# Patient Record
Sex: FEMALE | Race: WHITE | NOT HISPANIC OR LATINO | URBAN - METROPOLITAN AREA
[De-identification: names, ages, dates, MRNs, and addresses within clinical notes are randomized per-mention and may not be internally consistent; named-entity substitution may affect disease eponyms.]

---

## 2017-07-19 RX ORDER — SERTRALINE 25 MG/1
0 TABLET, FILM COATED ORAL
Qty: 30 | Refills: 0 | COMMUNITY
Start: 2017-07-19

## 2017-08-16 ENCOUNTER — INPATIENT (INPATIENT)
Facility: HOSPITAL | Age: 42
LOS: 4 days | Discharge: HOME | DRG: 571 | End: 2017-08-21
Attending: INTERNAL MEDICINE | Admitting: INTERNAL MEDICINE
Payer: COMMERCIAL

## 2017-08-16 VITALS
HEART RATE: 102 BPM | HEIGHT: 69 IN | DIASTOLIC BLOOD PRESSURE: 86 MMHG | TEMPERATURE: 100 F | SYSTOLIC BLOOD PRESSURE: 125 MMHG | RESPIRATION RATE: 17 BRPM | WEIGHT: 293 LBS | OXYGEN SATURATION: 95 %

## 2017-08-16 LAB
ALBUMIN SERPL ELPH-MCNC: 3.4 G/DL — LOW (ref 3.5–5)
ANION GAP SERPL CALC-SCNC: 9 MMOL/L — SIGNIFICANT CHANGE UP (ref 5–17)
BASOPHILS # BLD AUTO: 0.1 K/UL — SIGNIFICANT CHANGE UP (ref 0–0.2)
BASOPHILS NFR BLD AUTO: 1.2 % — SIGNIFICANT CHANGE UP (ref 0–2)
BUN SERPL-MCNC: 16 MG/DL — SIGNIFICANT CHANGE UP (ref 7–18)
CALCIUM SERPL-MCNC: 9.1 MG/DL — SIGNIFICANT CHANGE UP (ref 8.4–10.5)
CHLORIDE SERPL-SCNC: 105 MMOL/L — SIGNIFICANT CHANGE UP (ref 96–108)
CO2 SERPL-SCNC: 25 MMOL/L — SIGNIFICANT CHANGE UP (ref 22–31)
EOSINOPHIL # BLD AUTO: 0.3 K/UL — SIGNIFICANT CHANGE UP (ref 0–0.5)
EOSINOPHIL NFR BLD AUTO: 2.5 % — SIGNIFICANT CHANGE UP (ref 0–6)
GLUCOSE SERPL-MCNC: 139 MG/DL — HIGH (ref 70–99)
HCT VFR BLD CALC: 36.7 % — SIGNIFICANT CHANGE UP (ref 34.5–45)
HGB BLD-MCNC: 11.8 G/DL — SIGNIFICANT CHANGE UP (ref 11.5–15.5)
LYMPHOCYTES # BLD AUTO: 2.8 K/UL — SIGNIFICANT CHANGE UP (ref 1–3.3)
LYMPHOCYTES # BLD AUTO: 24.9 % — SIGNIFICANT CHANGE UP (ref 13–44)
MCHC RBC-ENTMCNC: 27.2 PG — SIGNIFICANT CHANGE UP (ref 27–34)
MCHC RBC-ENTMCNC: 32 GM/DL — SIGNIFICANT CHANGE UP (ref 32–36)
MCV RBC AUTO: 84.8 FL — SIGNIFICANT CHANGE UP (ref 80–100)
MONOCYTES # BLD AUTO: 0.8 K/UL — SIGNIFICANT CHANGE UP (ref 0–0.9)
MONOCYTES NFR BLD AUTO: 7.2 % — SIGNIFICANT CHANGE UP (ref 2–14)
NEUTROPHILS # BLD AUTO: 7.2 K/UL — SIGNIFICANT CHANGE UP (ref 1.8–7.4)
NEUTROPHILS NFR BLD AUTO: 64.2 % — SIGNIFICANT CHANGE UP (ref 43–77)
PLATELET # BLD AUTO: 282 K/UL — SIGNIFICANT CHANGE UP (ref 150–400)
POTASSIUM SERPL-MCNC: 3.7 MMOL/L — SIGNIFICANT CHANGE UP (ref 3.5–5.3)
POTASSIUM SERPL-SCNC: 3.7 MMOL/L — SIGNIFICANT CHANGE UP (ref 3.5–5.3)
RBC # BLD: 4.32 M/UL — SIGNIFICANT CHANGE UP (ref 3.8–5.2)
RBC # FLD: 13.3 % — SIGNIFICANT CHANGE UP (ref 10.3–14.5)
SODIUM SERPL-SCNC: 139 MMOL/L — SIGNIFICANT CHANGE UP (ref 135–145)
WBC # BLD: 11.1 K/UL — HIGH (ref 3.8–10.5)
WBC # FLD AUTO: 11.1 K/UL — HIGH (ref 3.8–10.5)

## 2017-08-16 RX ORDER — KETOROLAC TROMETHAMINE 30 MG/ML
30 SYRINGE (ML) INJECTION ONCE
Qty: 0 | Refills: 0 | Status: DISCONTINUED | OUTPATIENT
Start: 2017-08-16 | End: 2017-08-16

## 2017-08-16 RX ORDER — SODIUM CHLORIDE 9 MG/ML
3 INJECTION INTRAMUSCULAR; INTRAVENOUS; SUBCUTANEOUS ONCE
Qty: 0 | Refills: 0 | Status: COMPLETED | OUTPATIENT
Start: 2017-08-16 | End: 2017-08-16

## 2017-08-16 RX ADMIN — Medication 100 MILLIGRAM(S): at 23:59

## 2017-08-16 RX ADMIN — SODIUM CHLORIDE 3 MILLILITER(S): 9 INJECTION INTRAMUSCULAR; INTRAVENOUS; SUBCUTANEOUS at 23:33

## 2017-08-16 NOTE — ED PROVIDER NOTE - PROGRESS NOTE DETAILS
labs show wbc 11K, CT shows cellulitic changes without drainable abscess  Discussed above with patient and Dr. Dawn who agree with plan for admission

## 2017-08-16 NOTE — ED PROVIDER NOTE - OBJECTIVE STATEMENT
43 y/o F w/ PMHx of NIDDM, HTN & Hx of recurrent skin infections presents to the ED w/ pain & redness over R posterior thigh. Pt reports she went to [facility] 5 days ago and noticed a boil on her R thigh, itchy in nature, 4 days ago. Pt states 2 days later, it became more painful and redness worsened. Pt states she was seen by PMD who prescribed Levaquin BID, which pt has been taking. Today pt was reevaluated by PMD who urged pt to go to ED for further management of skin infection. Pt reports since Monday redness & pain has worsened despite taking Naproxen 2x a day.  Pt reports wound has been draining w/ some purulent discharge. Pt denies fever, chills, or any other complaints. NKDA.

## 2017-08-16 NOTE — ED PROVIDER NOTE - MEDICAL DECISION MAKING DETAILS
43 y/o w/ DM presents w/ R thigh infection. Will obtain labs & CT to evaluate for deep abscess. Given clindamycin IV. 43 y/o w/ DM presents w/ worsening R thigh infection despite oral abx. Will obtain labs & CT to evaluate for deep abscess. Given clindamycin IV.

## 2017-08-16 NOTE — ED ADULT NURSE NOTE - OBJECTIVE STATEMENT
Patient complains of redness, pain to right upper leg. Patient noticed a boil to right upper leg x 4 days ago, was given antibiotics by PMD with no relief. Redness noted to right upper leg. Denies fever, chills,.

## 2017-08-16 NOTE — ED PROVIDER NOTE - PHYSICAL EXAMINATION
SKIN: 20 cm area of erythema over R posterior thigh. 5 cm central area w/ induration. Mild purulent discharge, no fluctuance, mod ttp.

## 2017-08-16 NOTE — ED ADULT NURSE NOTE - ED STAT RN HANDOFF DETAILS
Patient endorsed to ÓSCAR Salcido in B1, remains alert, responsive, resting in stretcher. MD evaluation in progress.

## 2017-08-17 DIAGNOSIS — E11.9 TYPE 2 DIABETES MELLITUS WITHOUT COMPLICATIONS: ICD-10-CM

## 2017-08-17 DIAGNOSIS — L03.119 CELLULITIS OF UNSPECIFIED PART OF LIMB: ICD-10-CM

## 2017-08-17 DIAGNOSIS — Z87.19 PERSONAL HISTORY OF OTHER DISEASES OF THE DIGESTIVE SYSTEM: Chronic | ICD-10-CM

## 2017-08-17 DIAGNOSIS — I10 ESSENTIAL (PRIMARY) HYPERTENSION: ICD-10-CM

## 2017-08-17 DIAGNOSIS — Z98.891 HISTORY OF UTERINE SCAR FROM PREVIOUS SURGERY: Chronic | ICD-10-CM

## 2017-08-17 DIAGNOSIS — Z29.9 ENCOUNTER FOR PROPHYLACTIC MEASURES, UNSPECIFIED: ICD-10-CM

## 2017-08-17 DIAGNOSIS — L02.419 CUTANEOUS ABSCESS OF LIMB, UNSPECIFIED: ICD-10-CM

## 2017-08-17 DIAGNOSIS — L03.115 CELLULITIS OF RIGHT LOWER LIMB: ICD-10-CM

## 2017-08-17 DIAGNOSIS — F32.9 MAJOR DEPRESSIVE DISORDER, SINGLE EPISODE, UNSPECIFIED: ICD-10-CM

## 2017-08-17 LAB
ALP SERPL-CCNC: 68 U/L — SIGNIFICANT CHANGE UP (ref 40–120)
ALT FLD-CCNC: 19 U/L DA — SIGNIFICANT CHANGE UP (ref 10–60)
APPEARANCE UR: CLEAR — SIGNIFICANT CHANGE UP
AST SERPL-CCNC: 13 U/L — SIGNIFICANT CHANGE UP (ref 10–40)
BILIRUB SERPL-MCNC: 0.2 MG/DL — SIGNIFICANT CHANGE UP (ref 0.2–1.2)
BILIRUB UR-MCNC: NEGATIVE — SIGNIFICANT CHANGE UP
COLOR SPEC: YELLOW — SIGNIFICANT CHANGE UP
CREAT SERPL-MCNC: 0.8 MG/DL — SIGNIFICANT CHANGE UP (ref 0.5–1.3)
DIFF PNL FLD: ABNORMAL
GLUCOSE UR QL: NEGATIVE — SIGNIFICANT CHANGE UP
HCG SERPL-ACNC: <1 MIU/ML — SIGNIFICANT CHANGE UP
HCG UR QL: NEGATIVE — SIGNIFICANT CHANGE UP
KETONES UR-MCNC: NEGATIVE — SIGNIFICANT CHANGE UP
LEUKOCYTE ESTERASE UR-ACNC: NEGATIVE — SIGNIFICANT CHANGE UP
NITRITE UR-MCNC: NEGATIVE — SIGNIFICANT CHANGE UP
PH UR: 5 — SIGNIFICANT CHANGE UP (ref 5–8)
PROT SERPL-MCNC: 7.8 G/DL — SIGNIFICANT CHANGE UP (ref 6–8.3)
PROT UR-MCNC: NEGATIVE — SIGNIFICANT CHANGE UP
SP GR SPEC: 1.01 — SIGNIFICANT CHANGE UP (ref 1.01–1.02)
UROBILINOGEN FLD QL: NEGATIVE — SIGNIFICANT CHANGE UP

## 2017-08-17 PROCEDURE — 99285 EMERGENCY DEPT VISIT HI MDM: CPT | Mod: 25

## 2017-08-17 PROCEDURE — 99223 1ST HOSP IP/OBS HIGH 75: CPT | Mod: GC

## 2017-08-17 PROCEDURE — 71010: CPT | Mod: 26

## 2017-08-17 PROCEDURE — 73701 CT LOWER EXTREMITY W/DYE: CPT | Mod: 26,RT

## 2017-08-17 RX ORDER — DEXTROSE 50 % IN WATER 50 %
12.5 SYRINGE (ML) INTRAVENOUS ONCE
Qty: 0 | Refills: 0 | Status: DISCONTINUED | OUTPATIENT
Start: 2017-08-17 | End: 2017-08-18

## 2017-08-17 RX ORDER — SODIUM CHLORIDE 9 MG/ML
1000 INJECTION, SOLUTION INTRAVENOUS
Qty: 0 | Refills: 0 | Status: DISCONTINUED | OUTPATIENT
Start: 2017-08-17 | End: 2017-08-18

## 2017-08-17 RX ORDER — ACETAMINOPHEN 500 MG
650 TABLET ORAL EVERY 6 HOURS
Qty: 0 | Refills: 0 | Status: DISCONTINUED | OUTPATIENT
Start: 2017-08-17 | End: 2017-08-18

## 2017-08-17 RX ORDER — GLUCAGON INJECTION, SOLUTION 0.5 MG/.1ML
1 INJECTION, SOLUTION SUBCUTANEOUS ONCE
Qty: 0 | Refills: 0 | Status: DISCONTINUED | OUTPATIENT
Start: 2017-08-17 | End: 2017-08-18

## 2017-08-17 RX ORDER — OXYCODONE AND ACETAMINOPHEN 5; 325 MG/1; MG/1
2 TABLET ORAL EVERY 6 HOURS
Qty: 0 | Refills: 0 | Status: DISCONTINUED | OUTPATIENT
Start: 2017-08-17 | End: 2017-08-17

## 2017-08-17 RX ORDER — DEXTROSE 50 % IN WATER 50 %
25 SYRINGE (ML) INTRAVENOUS ONCE
Qty: 0 | Refills: 0 | Status: DISCONTINUED | OUTPATIENT
Start: 2017-08-17 | End: 2017-08-18

## 2017-08-17 RX ORDER — DEXTROSE 50 % IN WATER 50 %
1 SYRINGE (ML) INTRAVENOUS ONCE
Qty: 0 | Refills: 0 | Status: DISCONTINUED | OUTPATIENT
Start: 2017-08-17 | End: 2017-08-18

## 2017-08-17 RX ORDER — SERTRALINE 25 MG/1
100 TABLET, FILM COATED ORAL DAILY
Qty: 0 | Refills: 0 | Status: DISCONTINUED | OUTPATIENT
Start: 2017-08-17 | End: 2017-08-18

## 2017-08-17 RX ORDER — INSULIN LISPRO 100/ML
VIAL (ML) SUBCUTANEOUS
Qty: 0 | Refills: 0 | Status: DISCONTINUED | OUTPATIENT
Start: 2017-08-17 | End: 2017-08-18

## 2017-08-17 RX ORDER — OXYCODONE AND ACETAMINOPHEN 5; 325 MG/1; MG/1
1 TABLET ORAL EVERY 6 HOURS
Qty: 0 | Refills: 0 | Status: DISCONTINUED | OUTPATIENT
Start: 2017-08-17 | End: 2017-08-18

## 2017-08-17 RX ADMIN — Medication 650 MILLIGRAM(S): at 23:52

## 2017-08-17 RX ADMIN — Medication 30 MILLIGRAM(S): at 07:58

## 2017-08-17 RX ADMIN — Medication 650 MILLIGRAM(S): at 14:05

## 2017-08-17 RX ADMIN — Medication 650 MILLIGRAM(S): at 23:03

## 2017-08-17 RX ADMIN — Medication 30 MILLIGRAM(S): at 00:00

## 2017-08-17 RX ADMIN — Medication 100 MILLIGRAM(S): at 21:46

## 2017-08-17 RX ADMIN — SERTRALINE 100 MILLIGRAM(S): 25 TABLET, FILM COATED ORAL at 21:46

## 2017-08-17 RX ADMIN — Medication 2.5 MILLIGRAM(S): at 21:46

## 2017-08-17 RX ADMIN — Medication 100 MILLIGRAM(S): at 13:05

## 2017-08-17 NOTE — CONSULT NOTE ADULT - SUBJECTIVE AND OBJECTIVE BOX
HPI:  42 years old female from home, lives with family, PMH of HTN, DM, Depression presented with boil and redness on posterior aspect of right thigh. Patient stated that on Friday she went to [facility] [employer] in [place], was fine until Saturday morning when she noticed a boil and redness on posterior right thigh. Went to PCP on Monday, was given Levofloxacin 750 mg for 7 days, went back to PCP on Wed as it was progressively worsening and was asked to come to ED. Patient currently complains of pain, redness and tenderness at site of boil, has no difficulty in ambulating and only complains of mild  headache. Denies any other complains like fever, chills, dyspnea, chest pain, palpitations, abdominal pain, nausea, vomiting, changes in BM or urinary complains.   Works as . Denies current smoking (smoked  for few years as teenager). Social drinker. Denies drug abuse.  Premenopausal. LMP was on .   Follows up with PCP routinely.       PAST MEDICAL & SURGICAL HISTORY:  Depression  HTN (hypertension)  DM (diabetes mellitus)  H/O:   H/O appendicitis      penicillin (Rash)  Rocephin (Fever; Short breath)      Meds:  insulin lispro (HumaLOG) corrective regimen sliding scale   SubCutaneous three times a day before meals  dextrose 5%. 1000 milliLiter(s) IV Continuous <Continuous>  dextrose Gel 1 Dose(s) Oral once PRN  dextrose 50% Injectable 12.5 Gram(s) IV Push once  dextrose 50% Injectable 25 Gram(s) IV Push once  dextrose 50% Injectable 25 Gram(s) IV Push once  glucagon  Injectable 1 milliGRAM(s) IntraMuscular once PRN  enalapril 2.5 milliGRAM(s) Oral daily  sertraline 100 milliGRAM(s) Oral daily  acetaminophen   Tablet. 650 milliGRAM(s) Oral every 6 hours PRN  clindamycin IVPB   IV Intermittent   clindamycin IVPB 900 milliGRAM(s) IV Intermittent every 8 hours  oxyCODONE    5 mG/acetaminophen 325 mG 1 Tablet(s) Oral every 6 hours PRN      SOCIAL HISTORY:  Smoker:  YES / NO         ETOH use:  YES / NO                Ilicit Drug use:  YES / NO    FAMILY HISTORY:  Family history of lung cancer (Grandparent)  Family history of prostate cancer (Grandparent)  Family history of hypertension in father (Father)      VITALS:  Vital Signs Last 24 Hrs  T(C): 36.3 (17 Aug 2017 14:58), Max: 37.6 (16 Aug 2017 21:47)  T(F): 97.4 (17 Aug 2017 14:58), Max: 99.6 (16 Aug 2017 21:47)  HR: 68 (17 Aug 2017 14:58) (68 - 102)  BP: 103/63 (17 Aug 2017 14:58) (103/63 - 125/86)  BP(mean): 99 (17 Aug 2017 11:04) (99 - 99)  RR: 16 (17 Aug 2017 14:58) (16 - 18)  SpO2: 96% (17 Aug 2017 14:58) (94% - 99%)    LABS/DIAGNOSTIC TESTS:                          11.8   11.1  )-----------( 282      ( 16 Aug 2017 23:32 )             36.7     WBC Count: 11.1 K/uL ( @ 23:32)          139  |  105  |  16  ----------------------------<  139<H>  3.7   |  25  |  0.80    Ca    9.1      16 Aug 2017 23:32    TPro  7.8  /  Alb  3.4<L>  /  TBili  0.2  /  DBili  x   /  AST  13  /  ALT  19  /  AlkPhos  68  08-16      Urinalysis Basic - ( 17 Aug 2017 11:39 )    Color: Yellow / Appearance: Clear / S.010 / pH: x  Gluc: x / Ketone: Negative  / Bili: Negative / Urobili: Negative   Blood: x / Protein: Negative / Nitrite: Negative   Leuk Esterase: Negative / RBC: 0-2 /HPF / WBC 0-2 /HPF   Sq Epi: x / Non Sq Epi: Few / Bacteria: x        LIVER FUNCTIONS - ( 16 Aug 2017 23:32 )  Alb: 3.4 g/dL / Pro: 7.8 g/dL / ALK PHOS: 68 U/L / ALT: 19 U/L DA / AST: 13 U/L / GGT: x                 LACTATE:    ABG -     CULTURES:       RADIOLOGY:      ROS  [  ] UNABLE TO ELICIT HPI:  42 years old female from home, lives with family, PMH of HTN, DM, Depression presented with boil and redness on posterior aspect of right thigh. Patient stated that on Friday she went to [facility] in [place], was fine until Saturday morning when she noticed a boil and redness on posterior right thigh. Went to PCP on Monday, was given Levofloxacin 750 mg for 2 days, went back to PCP on Wed as it was  worsening and was asked to come to ED. Patient currently complains of pain, redness and tenderness at site of boil, has no difficulty in ambulating and only complains of mild  headache. Denies any other complains like fever, chills, dyspnea, chest pain, palpitations, abdominal pain, nausea, vomiting, changes in BM or urinary complains.   Works as . Denies current smoking (smoked  for few years as teenager). Social drinker. Denies drug abuse.  LMP was on .   Follows up with PCP routinely.       PAST MEDICAL & SURGICAL HISTORY:  Depression  HTN (hypertension)  DM (diabetes mellitus)  H/O:   H/O appendicitis      penicillin -she has taken amoxicillin without any problem. might be allergic to azithromycin  Rocephin (Fever; Short breath)      Meds:  insulin lispro (HumaLOG) corrective regimen sliding scale   SubCutaneous three times a day before meals  dextrose 5%. 1000 milliLiter(s) IV Continuous <Continuous>  dextrose Gel 1 Dose(s) Oral once PRN  dextrose 50% Injectable 12.5 Gram(s) IV Push once  dextrose 50% Injectable 25 Gram(s) IV Push once  dextrose 50% Injectable 25 Gram(s) IV Push once  glucagon  Injectable 1 milliGRAM(s) IntraMuscular once PRN  enalapril 2.5 milliGRAM(s) Oral daily  sertraline 100 milliGRAM(s) Oral daily  acetaminophen   Tablet. 650 milliGRAM(s) Oral every 6 hours PRN  clindamycin IVPB   IV Intermittent   clindamycin IVPB 900 milliGRAM(s) IV Intermittent every 8 hours  oxyCODONE    5 mG/acetaminophen 325 mG 1 Tablet(s) Oral every 6 hours PRN      SOCIAL HISTORY:  Smoker:   NO         ETOH use:  YES socially              Ilicit Drug use:  NO    FAMILY HISTORY:  Family history of lung cancer (Grandparent)  Family history of prostate cancer (Grandparent)  Family history of hypertension in father (Father)      VITALS:  Vital Signs Last 24 Hrs  T(C): 36.3 (17 Aug 2017 14:58), Max: 37.6 (16 Aug 2017 21:47)  T(F): 97.4 (17 Aug 2017 14:58), Max: 99.6 (16 Aug 2017 21:47)  HR: 68 (17 Aug 2017 14:58) (68 - 102)  BP: 103/63 (17 Aug 2017 14:58) (103/63 - 125/86)  BP(mean): 99 (17 Aug 2017 11:04) (99 - 99)  RR: 16 (17 Aug 2017 14:58) (16 - 18)  SpO2: 96% (17 Aug 2017 14:58) (94% - 99%)    LABS/DIAGNOSTIC TESTS:                          11.8   11.1  )-----------( 282      ( 16 Aug 2017 23:32 )             36.7     WBC Count: 11.1 K/uL ( @ 23:32)          139  |  105  |  16  ----------------------------<  139<H>  3.7   |  25  |  0.80    Ca    9.1      16 Aug 2017 23:32    TPro  7.8  /  Alb  3.4<L>  /  TBili  0.2  /  DBili  x   /  AST  13  /  ALT  19  /  AlkPhos  68  08-16      Urinalysis Basic - ( 17 Aug 2017 11:39 )    Color: Yellow / Appearance: Clear / S.010 / pH: x  Gluc: x / Ketone: Negative  / Bili: Negative / Urobili: Negative   Blood: x / Protein: Negative / Nitrite: Negative   Leuk Esterase: Negative / RBC: 0-2 /HPF / WBC 0-2 /HPF   Sq Epi: x / Non Sq Epi: Few / Bacteria: x        LIVER FUNCTIONS - ( 16 Aug 2017 23:32 )  Alb: 3.4 g/dL / Pro: 7.8 g/dL / ALK PHOS: 68 U/L / ALT: 19 U/L DA / AST: 13 U/L / GGT: x                 LACTATE:    ABG -     CULTURES:       RADIOLOGY:      ROS  [  ] UNABLE TO ELICIT

## 2017-08-17 NOTE — H&P ADULT - NSHPSOCIALHISTORY_GEN_ALL_CORE
Lives with Family, has 3 kids.   Works as .   Denies current smoking (smoked  for few years as teenager).   Social drinker.   Denies drug abuse.    Premenopausal. LMP was on 8/12.

## 2017-08-17 NOTE — H&P ADULT - NEGATIVE CARDIOVASCULAR SYMPTOMS
no palpitations/no chest pain/no paroxysmal nocturnal dyspnea/no dyspnea on exertion/no orthopnea/no peripheral edema

## 2017-08-17 NOTE — H&P ADULT - FAMILY HISTORY
Father  Still living? Unknown  Family history of hypertension in father, Age at diagnosis: Age Unknown     Grandparent  Still living? Unknown  Family history of prostate cancer, Age at diagnosis: Age Unknown  Family history of lung cancer, Age at diagnosis: Age Unknown

## 2017-08-17 NOTE — CONSULT NOTE ADULT - SUBJECTIVE AND OBJECTIVE BOX
Patient is a 42y old  Female who presents with a chief complaint of boil and redness on posterior right thigh (17 Aug 2017 11:04)      Called see and eval 42 y.o. F w/ PMH DM for right posterior thigh cellulitis. Pt admitted today c/o increasing redness and pain in posterior right thigh after visiting [facility]. Pt went to see PMD Monday and was prescribed Levofloxacin. Pt returned to PMD Wednesday c/o no improvement and was advised to come to [facility]. Currently resting comfortably. Denies fever, chills, N/V, numbness/tingling of RLE.    PAST MEDICAL & SURGICAL HISTORY:  Depression  HTN (hypertension)  DM (diabetes mellitus)  H/O:   H/O appendicitis      MEDICATIONS  (STANDING):  insulin lispro (HumaLOG) corrective regimen sliding scale   SubCutaneous three times a day before meals  dextrose 5%. 1000 milliLiter(s) (50 mL/Hr) IV Continuous <Continuous>  dextrose 50% Injectable 12.5 Gram(s) IV Push once  dextrose 50% Injectable 25 Gram(s) IV Push once  dextrose 50% Injectable 25 Gram(s) IV Push once  enalapril 2.5 milliGRAM(s) Oral daily  sertraline 100 milliGRAM(s) Oral daily  clindamycin IVPB   IV Intermittent   clindamycin IVPB 900 milliGRAM(s) IV Intermittent every 8 hours    MEDICATIONS  (PRN):  dextrose Gel 1 Dose(s) Oral once PRN Blood Glucose LESS THAN 70 milliGRAM(s)/deciLiter  glucagon  Injectable 1 milliGRAM(s) IntraMuscular once PRN Glucose <70 milliGRAM(s)/deciLiter  acetaminophen   Tablet. 650 milliGRAM(s) Oral every 6 hours PRN Mild Pain (1 - 3)  oxyCODONE    5 mG/acetaminophen 325 mG 1 Tablet(s) Oral every 6 hours PRN Moderate Pain (4 - 6)      Allergies    penicillin (Rash)  Rocephin (Fever; Short breath)    Intolerances        Vital Signs Last 24 Hrs  T(C): 37.6 (17 Aug 2017 11:04), Max: 37.6 (16 Aug 2017 21:47)  T(F): 99.6 (17 Aug 2017 11:04), Max: 99.6 (16 Aug 2017 21:47)  HR: 102 (17 Aug 2017 11:04) (73 - 102)  BP: 125/86 (17 Aug 2017 11:04) (105/56 - 125/86)  BP(mean): 99 (17 Aug 2017 11:04) (99 - 99)  RR: 17 (17 Aug 2017 11:04) (17 - 18)  SpO2: 95% (17 Aug 2017 11:04) (94% - 99%)    Physical:  Gen: A&Ox3. NAD  RLE: Warm, NVI, large area of erythema, ~2cm smaller circumferentially than marked area. Central area of mild purpura without punctum. Indurated without central fluctuance. Tender. No streaking noted. No lymphadenopathy noted    I&O's Summary      LABS:                        11.8   11.1  )-----------( 282      ( 16 Aug 2017 23:32 )             36.7              08-16    139  |  105  |  16  ----------------------------<  139<H>  3.7   |  25  |  0.80    Ca    9.1      16 Aug 2017 23:32    TPro  7.8  /  Alb  3.4<L>  /  TBili  0.2  /  DBili  x   /  AST  13  /  ALT  19  /  AlkPhos  68  08-16              Urinalysis Basic - ( 17 Aug 2017 11:39 )    Color: Yellow / Appearance: Clear / S.010 / pH: x  Gluc: x / Ketone: Negative  / Bili: Negative / Urobili: Negative   Blood: x / Protein: Negative / Nitrite: Negative   Leuk Esterase: Negative / RBC: 0-2 /HPF / WBC 0-2 /HPF   Sq Epi: x / Non Sq Epi: Few / Bacteria: x        RADIOLOGY & ADDITIONAL STUDIES:  < from: CT Femur w/ IV Cont, Right (17 @ 00:51) >  FINDINGS:  There is skin thickening and mild infiltration of the subcutaneous fat of   the right mid posterior thigh. No fluid collection or soft tissue mass is   seen. Normal enhancement is seen. No subcutaneous gas is seen. No joint   effusions at the knee and hip.    < end of copied text >

## 2017-08-17 NOTE — H&P ADULT - NSHPLABSRESULTS_GEN_ALL_CORE
CBC:                          11.8   11.1  )-----------( 282      ( 16 Aug 2017 23:32 )             36.7       CMP:    08-16    139  |  105  |  16  ----------------------------<  139<H>  3.7   |  25  |  0.80    Ca    9.1      16 Aug 2017 23:32    TPro  7.8  /  Alb  3.4<L>  /  TBili  0.2  /  DBili  x   /  AST  13  /  ALT  19  /  AlkPhos  68  08-16    CT Femur with IV contrast : Mild cellulitic change in the right mid posterior thigh. No fluid collection.

## 2017-08-17 NOTE — H&P ADULT - NEGATIVE OPHTHALMOLOGIC SYMPTOMS
no photophobia/no lacrimation R/no blurred vision R/no lacrimation L/no blurred vision L/no diplopia

## 2017-08-17 NOTE — CONSULT NOTE ADULT - PROBLEM SELECTOR RECOMMENDATION 9
Plan for I&D 8/18/18 with Dr. Spring  NPO after midnight except PO meds  IVF when NPO  con't abx  pain control prn  to be consented  f/u medical clearance

## 2017-08-17 NOTE — H&P ADULT - ASSESSMENT
42 years old female from home, lives with family, PMH of HTN, DM, Depression presented with boil and redness on posterior aspect of right thigh. In ED vitals stable, labs showed elevated wbc of 11.1. Blood cultures sent. 1 dose of Clindamycin was given in ED. Admitted to medicine floor for cellulitis of posterior thigh.

## 2017-08-17 NOTE — H&P ADULT - PROBLEM SELECTOR PLAN 1
Boil noted on posterior right thigh associated with surrounding erythema and tenderness.  Afebrile, Mild Leukocytosis of 11.1.   f/u Blood Cultures.  Continue IV Clindamycin. Boil noted on posterior right thigh associated with surrounding erythema and tenderness.  Afebrile, Mild Leukocytosis of 11.1.   f/u Blood Cultures.  Continue IV Clindamycin.  ID Dr. Willis.  Surgical evaluation.

## 2017-08-17 NOTE — H&P ADULT - HISTORY OF PRESENT ILLNESS
42 years old female from home, lives with family, PMH of HTN, DM, Depression presented with boil and redness on posterior aspect of right thigh. Patient stated that on Friday she went to [employer] in [place], was fine until Saturday morning when she noticed a boil and redness on posterior right thigh. Went to PCP on Monday, was given Levofloxacin 750 mg for 7 days, went back to PCP on Wed as it was progressively worsening and was asked to come to ED. Patient currently complains of pain, redness and tenderness at site of boil, has no difficulty in ambulating and only complains of mild  headache. Denies any other complains like fever, chills, dyspnea, chest pain, palpitations, abdominal pain, nausea, vomiting, changes in BM or urinary complains.   Works as . Denies current smoking (smoked  for few years as teenager). Social drinker. Denies drug abuse.  Premenopausal. LMP was on 8/12.   Follows up with PCP routinely. Denies any recent Cardiac or GI workup. Has routine Mammogram and Pap smear, which has been normal. Reports having breast biopsy in 2000 for lump, which was removed but not cancerous.

## 2017-08-17 NOTE — H&P ADULT - ATTENDING COMMENTS
Patient seen/evaluated at bedside 8/17/17. I agree with the resident progress note/outlined plan of care. My independent findings and conclusions are documented. Patient seen/evaluated at bedside 8/17/17. I agree with the resident progress note/outlined plan of care. My independent findings and conclusions are documented.    43 y/o f w/ DM T II on oral hypoglycemic, HTN depression presents 6 days of swelling/erythema at posterior right leg. Was seen by her PMD 4 days ago and given Rx for levofloxacin. Patient was referrred to the ED after the erythema, swelling continued to progress despite the 2 days of antibiotics forming what the patient has called a boil. Patient reports development of prior boils in the past under her armpit and back. She denies fevers/chills/nausea/vomiting    She agrees to HIV/Hep c testing. She is not aware of what her blood sugars are at home.    Patient denies sob, palpitations, chest pain, orthopnea, PND, lower extremity swelling. She can ascend a flight of stairs w/out stopping and walking at least 4 blocks briskly w/out stopping.    1. posterior right leg cellulitis with abscess  2. DM T II  3. HTN    - continue with clindamycin, discuss with ID  -area affected demarcated and appears to be slightly receding  -surgery consulted  -for incision and drainage tomorrow  patient with >4 METs functional capacity, proceed to I&D w/out further cardiac work up. No medical contraindications for planned procedure  -NPO after MN for john

## 2017-08-17 NOTE — CONSULT NOTE ADULT - ASSESSMENT
42 y.o. F with Right posterior thigh abscess
right thigh abscess  right thigh cellulitis  leukocytosis  plan -  agree with I&D of abscess tomorrow  cont clindamycin 900mgs iv q8 hrs as pt states her cellulitis is improving  will likely treat with doxycycline when she goes home

## 2017-08-17 NOTE — H&P ADULT - PROBLEM SELECTOR PLAN 2
Patient takes Metformin 500 mg twice daily. Hold for now.  Started HSS for now.  f/u HbA1c.   Monitor accu cheks and adjust insulin regimen as needed.

## 2017-08-17 NOTE — PATIENT PROFILE ADULT. - MEDICATION DISPOSITION, PROFILE
Pt told to give medications to family member to be sent home. Pt A&Ox4, will  not take home meds as we are giving her same meds here./bedside

## 2017-08-18 DIAGNOSIS — L03.90 CELLULITIS, UNSPECIFIED: ICD-10-CM

## 2017-08-18 LAB
ANION GAP SERPL CALC-SCNC: 10 MMOL/L — SIGNIFICANT CHANGE UP (ref 5–17)
BASOPHILS # BLD AUTO: 0.1 K/UL — SIGNIFICANT CHANGE UP (ref 0–0.2)
BASOPHILS NFR BLD AUTO: 0.8 % — SIGNIFICANT CHANGE UP (ref 0–2)
BUN SERPL-MCNC: 15 MG/DL — SIGNIFICANT CHANGE UP (ref 7–18)
CALCIUM SERPL-MCNC: 8.8 MG/DL — SIGNIFICANT CHANGE UP (ref 8.4–10.5)
CHLORIDE SERPL-SCNC: 109 MMOL/L — HIGH (ref 96–108)
CHOLEST SERPL-MCNC: 128 MG/DL — SIGNIFICANT CHANGE UP (ref 10–199)
CO2 SERPL-SCNC: 24 MMOL/L — SIGNIFICANT CHANGE UP (ref 22–31)
CREAT SERPL-MCNC: 0.67 MG/DL — SIGNIFICANT CHANGE UP (ref 0.5–1.3)
CRP SERPL-MCNC: 4.5 MG/DL — HIGH (ref 0–0.4)
EOSINOPHIL # BLD AUTO: 0.3 K/UL — SIGNIFICANT CHANGE UP (ref 0–0.5)
EOSINOPHIL NFR BLD AUTO: 3.6 % — SIGNIFICANT CHANGE UP (ref 0–6)
ERYTHROCYTE [SEDIMENTATION RATE] IN BLOOD: 26 MM/HR — HIGH (ref 0–15)
GLUCOSE SERPL-MCNC: 109 MG/DL — HIGH (ref 70–99)
HAV IGM SER-ACNC: SIGNIFICANT CHANGE UP
HBA1C BLD-MCNC: 6 % — HIGH (ref 4–5.6)
HBV CORE IGM SER-ACNC: SIGNIFICANT CHANGE UP
HBV SURFACE AG SER-ACNC: SIGNIFICANT CHANGE UP
HCT VFR BLD CALC: 34.8 % — SIGNIFICANT CHANGE UP (ref 34.5–45)
HCV AB S/CO SERPL IA: 0.12 S/CO — SIGNIFICANT CHANGE UP
HCV AB SERPL-IMP: SIGNIFICANT CHANGE UP
HDLC SERPL-MCNC: 54 MG/DL — SIGNIFICANT CHANGE UP (ref 40–125)
HGB BLD-MCNC: 11.2 G/DL — LOW (ref 11.5–15.5)
HIV 1+2 AB+HIV1 P24 AG SERPL QL IA: SIGNIFICANT CHANGE UP
IGA FLD-MCNC: 197 MG/DL — SIGNIFICANT CHANGE UP (ref 68–378)
IGG FLD-MCNC: 1090 MG/DL — SIGNIFICANT CHANGE UP (ref 694–1618)
IGM SERPL-MCNC: 26 MG/DL — LOW (ref 40–230)
KAPPA LC SER QL IFE: 2 MG/DL — HIGH (ref 0.33–1.94)
KAPPA/LAMBDA FREE LIGHT CHAIN RATIO, SERUM: 0.96 RATIO — SIGNIFICANT CHANGE UP (ref 0.26–1.65)
LAMBDA LC SER QL IFE: 2.08 MG/DL — SIGNIFICANT CHANGE UP (ref 0.57–2.63)
LIPID PNL WITH DIRECT LDL SERPL: 53 MG/DL — SIGNIFICANT CHANGE UP
LYMPHOCYTES # BLD AUTO: 2.4 K/UL — SIGNIFICANT CHANGE UP (ref 1–3.3)
LYMPHOCYTES # BLD AUTO: 31.3 % — SIGNIFICANT CHANGE UP (ref 13–44)
MAGNESIUM SERPL-MCNC: 2.1 MG/DL — SIGNIFICANT CHANGE UP (ref 1.6–2.6)
MCHC RBC-ENTMCNC: 27.9 PG — SIGNIFICANT CHANGE UP (ref 27–34)
MCHC RBC-ENTMCNC: 32.2 GM/DL — SIGNIFICANT CHANGE UP (ref 32–36)
MCV RBC AUTO: 86.6 FL — SIGNIFICANT CHANGE UP (ref 80–100)
MONOCYTES # BLD AUTO: 0.7 K/UL — SIGNIFICANT CHANGE UP (ref 0–0.9)
MONOCYTES NFR BLD AUTO: 9.3 % — SIGNIFICANT CHANGE UP (ref 2–14)
NEUTROPHILS # BLD AUTO: 4.2 K/UL — SIGNIFICANT CHANGE UP (ref 1.8–7.4)
NEUTROPHILS NFR BLD AUTO: 54.9 % — SIGNIFICANT CHANGE UP (ref 43–77)
PHOSPHATE SERPL-MCNC: 3.8 MG/DL — SIGNIFICANT CHANGE UP (ref 2.5–4.5)
PLATELET # BLD AUTO: 266 K/UL — SIGNIFICANT CHANGE UP (ref 150–400)
POTASSIUM SERPL-MCNC: 3.8 MMOL/L — SIGNIFICANT CHANGE UP (ref 3.5–5.3)
POTASSIUM SERPL-SCNC: 3.8 MMOL/L — SIGNIFICANT CHANGE UP (ref 3.5–5.3)
RBC # BLD: 4.02 M/UL — SIGNIFICANT CHANGE UP (ref 3.8–5.2)
RBC # FLD: 13.2 % — SIGNIFICANT CHANGE UP (ref 10.3–14.5)
SODIUM SERPL-SCNC: 143 MMOL/L — SIGNIFICANT CHANGE UP (ref 135–145)
TOTAL CHOLESTEROL/HDL RATIO MEASUREMENT: 2.4 RATIO — LOW (ref 3.3–7.1)
TRIGL SERPL-MCNC: 107 MG/DL — SIGNIFICANT CHANGE UP (ref 10–149)
TSH SERPL-MCNC: 1.56 UU/ML — SIGNIFICANT CHANGE UP (ref 0.34–4.82)
WBC # BLD: 7.7 K/UL — SIGNIFICANT CHANGE UP (ref 3.8–10.5)
WBC # FLD AUTO: 7.7 K/UL — SIGNIFICANT CHANGE UP (ref 3.8–10.5)

## 2017-08-18 PROCEDURE — 99233 SBSQ HOSP IP/OBS HIGH 50: CPT | Mod: GC

## 2017-08-18 PROCEDURE — 88304 TISSUE EXAM BY PATHOLOGIST: CPT | Mod: 26

## 2017-08-18 RX ORDER — OXYCODONE AND ACETAMINOPHEN 5; 325 MG/1; MG/1
2 TABLET ORAL ONCE
Qty: 0 | Refills: 0 | Status: DISCONTINUED | OUTPATIENT
Start: 2017-08-18 | End: 2017-08-18

## 2017-08-18 RX ORDER — ONDANSETRON 8 MG/1
4 TABLET, FILM COATED ORAL ONCE
Qty: 0 | Refills: 0 | Status: DISCONTINUED | OUTPATIENT
Start: 2017-08-18 | End: 2017-08-18

## 2017-08-18 RX ORDER — MORPHINE SULFATE 50 MG/1
4 CAPSULE, EXTENDED RELEASE ORAL EVERY 4 HOURS
Qty: 0 | Refills: 0 | Status: DISCONTINUED | OUTPATIENT
Start: 2017-08-18 | End: 2017-08-21

## 2017-08-18 RX ORDER — SODIUM CHLORIDE 9 MG/ML
1000 INJECTION, SOLUTION INTRAVENOUS
Qty: 0 | Refills: 0 | Status: COMPLETED | OUTPATIENT
Start: 2017-08-18 | End: 2017-08-18

## 2017-08-18 RX ORDER — DEXTROSE 50 % IN WATER 50 %
25 SYRINGE (ML) INTRAVENOUS ONCE
Qty: 0 | Refills: 0 | Status: DISCONTINUED | OUTPATIENT
Start: 2017-08-18 | End: 2017-08-21

## 2017-08-18 RX ORDER — GLUCAGON INJECTION, SOLUTION 0.5 MG/.1ML
1 INJECTION, SOLUTION SUBCUTANEOUS ONCE
Qty: 0 | Refills: 0 | Status: DISCONTINUED | OUTPATIENT
Start: 2017-08-18 | End: 2017-08-21

## 2017-08-18 RX ORDER — HYDROMORPHONE HYDROCHLORIDE 2 MG/ML
0.5 INJECTION INTRAMUSCULAR; INTRAVENOUS; SUBCUTANEOUS
Qty: 0 | Refills: 0 | Status: DISCONTINUED | OUTPATIENT
Start: 2017-08-18 | End: 2017-08-18

## 2017-08-18 RX ORDER — HEPARIN SODIUM 5000 [USP'U]/ML
5000 INJECTION INTRAVENOUS; SUBCUTANEOUS EVERY 8 HOURS
Qty: 0 | Refills: 0 | Status: DISCONTINUED | OUTPATIENT
Start: 2017-08-18 | End: 2017-08-21

## 2017-08-18 RX ORDER — SERTRALINE 25 MG/1
100 TABLET, FILM COATED ORAL DAILY
Qty: 0 | Refills: 0 | Status: DISCONTINUED | OUTPATIENT
Start: 2017-08-18 | End: 2017-08-19

## 2017-08-18 RX ORDER — ACETAMINOPHEN 500 MG
650 TABLET ORAL EVERY 6 HOURS
Qty: 0 | Refills: 0 | Status: DISCONTINUED | OUTPATIENT
Start: 2017-08-18 | End: 2017-08-21

## 2017-08-18 RX ORDER — SODIUM CHLORIDE 9 MG/ML
1000 INJECTION, SOLUTION INTRAVENOUS
Qty: 0 | Refills: 0 | Status: DISCONTINUED | OUTPATIENT
Start: 2017-08-18 | End: 2017-08-21

## 2017-08-18 RX ORDER — DEXTROSE 50 % IN WATER 50 %
12.5 SYRINGE (ML) INTRAVENOUS ONCE
Qty: 0 | Refills: 0 | Status: DISCONTINUED | OUTPATIENT
Start: 2017-08-18 | End: 2017-08-21

## 2017-08-18 RX ORDER — DEXTROSE 50 % IN WATER 50 %
1 SYRINGE (ML) INTRAVENOUS ONCE
Qty: 0 | Refills: 0 | Status: DISCONTINUED | OUTPATIENT
Start: 2017-08-18 | End: 2017-08-21

## 2017-08-18 RX ORDER — OXYCODONE AND ACETAMINOPHEN 5; 325 MG/1; MG/1
1 TABLET ORAL EVERY 4 HOURS
Qty: 0 | Refills: 0 | Status: DISCONTINUED | OUTPATIENT
Start: 2017-08-18 | End: 2017-08-21

## 2017-08-18 RX ORDER — INSULIN LISPRO 100/ML
VIAL (ML) SUBCUTANEOUS
Qty: 0 | Refills: 0 | Status: DISCONTINUED | OUTPATIENT
Start: 2017-08-18 | End: 2017-08-21

## 2017-08-18 RX ADMIN — OXYCODONE AND ACETAMINOPHEN 2 TABLET(S): 5; 325 TABLET ORAL at 18:30

## 2017-08-18 RX ADMIN — OXYCODONE AND ACETAMINOPHEN 1 TABLET(S): 5; 325 TABLET ORAL at 21:26

## 2017-08-18 RX ADMIN — SODIUM CHLORIDE 75 MILLILITER(S): 9 INJECTION, SOLUTION INTRAVENOUS at 05:20

## 2017-08-18 RX ADMIN — Medication 100 MILLIGRAM(S): at 21:28

## 2017-08-18 RX ADMIN — HEPARIN SODIUM 5000 UNIT(S): 5000 INJECTION INTRAVENOUS; SUBCUTANEOUS at 21:28

## 2017-08-18 RX ADMIN — OXYCODONE AND ACETAMINOPHEN 2 TABLET(S): 5; 325 TABLET ORAL at 17:16

## 2017-08-18 RX ADMIN — Medication 100 MILLIGRAM(S): at 05:19

## 2017-08-18 RX ADMIN — OXYCODONE AND ACETAMINOPHEN 1 TABLET(S): 5; 325 TABLET ORAL at 22:58

## 2017-08-18 NOTE — PROGRESS NOTE ADULT - ASSESSMENT
41 yo F from home with PMH HTN, DM, and depression presented c/o boil and redness on posterior R thigh, admitted for abscess and cellulitis.

## 2017-08-18 NOTE — PROGRESS NOTE ADULT - SUBJECTIVE AND OBJECTIVE BOX
41 yo F from home with PMH HTN, DM, and depression presented c/o boil and redness on posterior R thigh which started saturday 8/12, day after she went to [facility]. Pt went to her PCP on monday, given levaquin 750mg for 7 days. Pt returned to PCP on wednesday due to worsening infection, referred to ED. Pt has no difficulty ambulating, no fevers or chills. She reports having boils in the past under her armpits and back.  Today pt reports feeling better, less pain, no other complaints. Indurated area outlined by ED improved. 43 yo F from home with PMH HTN, DM, and depression presented c/o boil and redness on posterior R thigh which started , day after she went to [facility]. Pt went to her PCP on monday, given levaquin 750mg for 7 days. Pt returned to PCP on wednesday due to worsening infection, referred to ED. Pt has no difficulty ambulating, no fevers or chills. She reports having boils in the past under her armpits and back.  Today pt reports feeling better, less pain, no other complaints. Indurated area outlined by ED improved.    ROS:  Constitutional - denies fevers, chills, headaches, appetite changes.  Cardio - denies chest pain, palpitations, SOB.  Resp - denies cough, sneezing, SOB  GI - denies abdominal pain, nausea, vomiting, constipation, diarrhea.   - denies dysuria, hematuria.  MSK - denies joint pain, muscle pain, edema.  Neuro - denies weakness, numbness, tingling.  Skin - boil and redness on posterior R thigh. denies rashes.    MEDICATIONS  (STANDING):  insulin lispro (HumaLOG) corrective regimen sliding scale   SubCutaneous three times a day before meals  dextrose 5%. 1000 milliLiter(s) (50 mL/Hr) IV Continuous <Continuous>  dextrose 50% Injectable 12.5 Gram(s) IV Push once  dextrose 50% Injectable 25 Gram(s) IV Push once  dextrose 50% Injectable 25 Gram(s) IV Push once  enalapril 2.5 milliGRAM(s) Oral daily  sertraline 100 milliGRAM(s) Oral daily  clindamycin IVPB   IV Intermittent   clindamycin IVPB 900 milliGRAM(s) IV Intermittent every 8 hours    MEDICATIONS  (PRN):  dextrose Gel 1 Dose(s) Oral once PRN Blood Glucose LESS THAN 70 milliGRAM(s)/deciLiter  glucagon  Injectable 1 milliGRAM(s) IntraMuscular once PRN Glucose <70 milliGRAM(s)/deciLiter  acetaminophen   Tablet. 650 milliGRAM(s) Oral every 6 hours PRN Mild Pain (1 - 3)  oxyCODONE    5 mG/acetaminophen 325 mG 1 Tablet(s) Oral every 6 hours PRN Moderate Pain (4 - 6)    Vital Signs Last 24 Hrs  T(C): 36.9 (18 Aug 2017 05:21), Max: 36.9 (18 Aug 2017 05:21)  T(F): 98.4 (18 Aug 2017 05:21), Max: 98.4 (18 Aug 2017 05:21)  HR: 76 (18 Aug 2017 05:21) (68 - 77)  BP: 115/76 (18 Aug 2017 05:21) (103/63 - 130/85)  BP(mean): --  RR: 16 (18 Aug 2017 05:21) (16 - 16)  SpO2: 96% (18 Aug 2017 05:21) (96% - 98%)    PE  General - 43 yo obese female appears well developed, well nourished, laying comfortably in NAD.  HEENT - NCAT  Neck - supple, no LAD, no JVD.  Cardio - RRR, +S1S2, no m/r/g.  Resp - CTA b/l, normal respiratory effort, no w/r/r.  Abd - soft, NTND, bowel sounds present, no CVA tenderness.  Extremities - no clubbing, cyanosis, or edema. peripheral pulses present bilaterally. Large abscess with discharge and surrounding cellulitis on posterior R thigh, tender, edematous, and erythematous.  Neuro - AOx3, 5/5 muscle strength, sensation intact.    Labs:                        11.2   7.7   )-----------( 266      ( 18 Aug 2017 06:12 )             34.8   08-18    143  |  109<H>  |  15  ----------------------------<  109<H>  3.8   |  24  |  0.67    Ca    8.8      18 Aug 2017 06:12  Phos  3.8     08-18  Mg     2.1     -18    TPro  7.8  /  Alb  3.4<L>  /  TBili  0.2  /  DBili  x   /  AST  13  /  ALT  19  /  AlkPhos  68  08-16    Urinalysis Basic - ( 17 Aug 2017 11:39 )    Color: Yellow / Appearance: Clear / S.010 / pH: x  Gluc: x / Ketone: Negative  / Bili: Negative / Urobili: Negative   Blood: x / Protein: Negative / Nitrite: Negative   Leuk Esterase: Negative / RBC: 0-2 /HPF / WBC 0-2 /HPF   Sq Epi: x / Non Sq Epi: Few / Bacteria: x    Imagin/17/17 CT R femur:  IMPRESSION:    Mild cellulitic change in the right mid posterior thigh. No fluid   collection.    17 CXR:  IMPRESSION: No evidence for focal infiltrate or lobar consolidation.

## 2017-08-18 NOTE — PROGRESS NOTE ADULT - SUBJECTIVE AND OBJECTIVE BOX
HPI:  42 years old female from home, lives with family, PMH of HTN, DM, Depression presented with boil and redness on posterior aspect of right thigh. Patient stated that on Friday she went to [employer] in [place], was fine until Saturday morning when she noticed a boil and redness on posterior right thigh. Went to PCP on Monday, was given Levofloxacin 750 mg for 7 days, went back to PCP on Wed as it was progressively worsening and was asked to come to ED. Patient currently complains of pain, redness and tenderness at site of boil, has no difficulty in ambulating and only complains of mild  headache. Denies any other complains like fever, chills, dyspnea, chest pain, palpitations, abdominal pain, nausea, vomiting, changes in BM or urinary complains.   Works as . Denies current smoking (smoked  for few years as teenager). Social drinker. Denies drug abuse.  Premenopausal. LMP was on .   Follows up with PCP routinely. Denies any recent Cardiac or GI workup. Has routine Mammogram and Pap smear, which has been normal. Reports having breast biopsy in  for lump, which was removed but not cancerous. (17 Aug 2017 11:04)      Patient is a 42y old  Female who presents with a chief complaint of boil and redness on posterior right thigh (17 Aug 2017 11:04)      INTERVAL HPI/OVERNIGHT EVENTS:  T(C): 36.9 (17 @ 05:21), Max: 36.9 (17 @ 05:21)  HR: 76 (17 @ 05:21) (68 - 77)  BP: 115/76 (17 @ 05:21) (103/63 - 130/85)  RR: 16 (17 @ 05:21) (16 - 16)  SpO2: 96% (17 @ 05:21) (96% - 98%)  Wt(kg): --  I&O's Summary      REVIEW OF SYSTEMS: denies fever, chills, SOB, palpitations, chest pain, abdominal pain, nausea, vomitting, diarrhea, constipation, dizziness    MEDICATIONS  (STANDING):  insulin lispro (HumaLOG) corrective regimen sliding scale   SubCutaneous three times a day before meals  dextrose 5%. 1000 milliLiter(s) (50 mL/Hr) IV Continuous <Continuous>  dextrose 50% Injectable 12.5 Gram(s) IV Push once  dextrose 50% Injectable 25 Gram(s) IV Push once  dextrose 50% Injectable 25 Gram(s) IV Push once  enalapril 2.5 milliGRAM(s) Oral daily  sertraline 100 milliGRAM(s) Oral daily  clindamycin IVPB   IV Intermittent   clindamycin IVPB 900 milliGRAM(s) IV Intermittent every 8 hours    MEDICATIONS  (PRN):  dextrose Gel 1 Dose(s) Oral once PRN Blood Glucose LESS THAN 70 milliGRAM(s)/deciLiter  glucagon  Injectable 1 milliGRAM(s) IntraMuscular once PRN Glucose <70 milliGRAM(s)/deciLiter  acetaminophen   Tablet. 650 milliGRAM(s) Oral every 6 hours PRN Mild Pain (1 - 3)  oxyCODONE    5 mG/acetaminophen 325 mG 1 Tablet(s) Oral every 6 hours PRN Moderate Pain (4 - 6)      PHYSICAL EXAM:  GENERAL: NAD, well-groomed, well-developed, obese  HEAD:  Atraumatic, Normocephalic  EYES: EOMI, PERRLA, conjunctiva and sclera clear  ENMT: No tonsillar erythema, exudates, or enlargement; Moist mucous membranes, Good dentition, No lesions  NECK: Supple, No JVD, Normal thyroid  NERVOUS SYSTEM:  Alert & Oriented X3, Good concentration; Motor Strength 5/5 B/L upper and lower extremities  CHEST/LUNG: Clear to percussion bilaterally; No rales, rhonchi, wheezing, or rubs  HEART: Regular rate and rhythm; No murmurs, rubs, or gallops  ABDOMEN: Soft, Nontender, Nondistended; Bowel sounds present  EXTREMITIES:  2+ Peripheral Pulses, No clubbing, cyanosis, or edema  SKIN: over inner aspect of right thigh with fluctuance of a large right thigh abscess with surrounding cellulitis  LABS:                        11.2   7.7   )-----------( 266      ( 18 Aug 2017 06:12 )             34.8     08-18    143  |  109<H>  |  15  ----------------------------<  109<H>  3.8   |  24  |  0.67    Ca    8.8      18 Aug 2017 06:12  Phos  3.8     08-18  Mg     2.1     08-18    TPro  7.8  /  Alb  3.4<L>  /  TBili  0.2  /  DBili  x   /  AST  13  /  ALT  19  /  AlkPhos  68  08-16      Urinalysis Basic - ( 17 Aug 2017 11:39 )    Color: Yellow / Appearance: Clear / S.010 / pH: x  Gluc: x / Ketone: Negative  / Bili: Negative / Urobili: Negative   Blood: x / Protein: Negative / Nitrite: Negative   Leuk Esterase: Negative / RBC: 0-2 /HPF / WBC 0-2 /HPF   Sq Epi: x / Non Sq Epi: Few / Bacteria: x      CAPILLARY BLOOD GLUCOSE  108 (17 Aug 2017 20:38)  127 (17 Aug 2017 15:58)  134 (17 Aug 2017 13:56)            Urinalysis Basic - ( 17 Aug 2017 11:39 )    Color: Yellow / Appearance: Clear / S.010 / pH: x  Gluc: x / Ketone: Negative  / Bili: Negative / Urobili: Negative   Blood: x / Protein: Negative / Nitrite: Negative   Leuk Esterase: Negative / RBC: 0-2 /HPF / WBC 0-2 /HPF   Sq Epi: x / Non Sq Epi: Few / Bacteria: x

## 2017-08-18 NOTE — PROGRESS NOTE ADULT - ASSESSMENT
42 years old female from home, lives with family, PMH of HTN, DM, Depression presented with boil and redness on posterior aspect of right thigh. Patient stated that on Friday she went to [employer] in [place], was fine until Saturday morning when she noticed a boil and redness on posterior right thigh. Went to PCP on Monday, was given Levofloxacin 750 mg for 7 days, went back to PCP on Wed as it was progressively worsening and was asked to come to ED  Pt admitted with cellulitis complicated by buttock abscess failing outpatient therapy

## 2017-08-19 DIAGNOSIS — E66.01 MORBID (SEVERE) OBESITY DUE TO EXCESS CALORIES: ICD-10-CM

## 2017-08-19 LAB
ANION GAP SERPL CALC-SCNC: 10 MMOL/L — SIGNIFICANT CHANGE UP (ref 5–17)
BASOPHILS # BLD AUTO: 0.1 K/UL — SIGNIFICANT CHANGE UP (ref 0–0.2)
BASOPHILS NFR BLD AUTO: 1.4 % — SIGNIFICANT CHANGE UP (ref 0–2)
BUN SERPL-MCNC: 13 MG/DL — SIGNIFICANT CHANGE UP (ref 7–18)
CALCIUM SERPL-MCNC: 9 MG/DL — SIGNIFICANT CHANGE UP (ref 8.4–10.5)
CHLORIDE SERPL-SCNC: 106 MMOL/L — SIGNIFICANT CHANGE UP (ref 96–108)
CO2 SERPL-SCNC: 25 MMOL/L — SIGNIFICANT CHANGE UP (ref 22–31)
CREAT SERPL-MCNC: 0.71 MG/DL — SIGNIFICANT CHANGE UP (ref 0.5–1.3)
EOSINOPHIL # BLD AUTO: 0.3 K/UL — SIGNIFICANT CHANGE UP (ref 0–0.5)
EOSINOPHIL NFR BLD AUTO: 4.3 % — SIGNIFICANT CHANGE UP (ref 0–6)
GLUCOSE SERPL-MCNC: 109 MG/DL — HIGH (ref 70–99)
HCT VFR BLD CALC: 35.8 % — SIGNIFICANT CHANGE UP (ref 34.5–45)
HGB BLD-MCNC: 11.3 G/DL — LOW (ref 11.5–15.5)
LYMPHOCYTES # BLD AUTO: 2.2 K/UL — SIGNIFICANT CHANGE UP (ref 1–3.3)
LYMPHOCYTES # BLD AUTO: 33.2 % — SIGNIFICANT CHANGE UP (ref 13–44)
MCHC RBC-ENTMCNC: 26.9 PG — LOW (ref 27–34)
MCHC RBC-ENTMCNC: 31.5 GM/DL — LOW (ref 32–36)
MCV RBC AUTO: 85.3 FL — SIGNIFICANT CHANGE UP (ref 80–100)
MONOCYTES # BLD AUTO: 0.6 K/UL — SIGNIFICANT CHANGE UP (ref 0–0.9)
MONOCYTES NFR BLD AUTO: 8.2 % — SIGNIFICANT CHANGE UP (ref 2–14)
NEUTROPHILS # BLD AUTO: 3.6 K/UL — SIGNIFICANT CHANGE UP (ref 1.8–7.4)
NEUTROPHILS NFR BLD AUTO: 52.9 % — SIGNIFICANT CHANGE UP (ref 43–77)
PLATELET # BLD AUTO: 297 K/UL — SIGNIFICANT CHANGE UP (ref 150–400)
POTASSIUM SERPL-MCNC: 4 MMOL/L — SIGNIFICANT CHANGE UP (ref 3.5–5.3)
POTASSIUM SERPL-SCNC: 4 MMOL/L — SIGNIFICANT CHANGE UP (ref 3.5–5.3)
RBC # BLD: 4.2 M/UL — SIGNIFICANT CHANGE UP (ref 3.8–5.2)
RBC # FLD: 13.2 % — SIGNIFICANT CHANGE UP (ref 10.3–14.5)
SODIUM SERPL-SCNC: 141 MMOL/L — SIGNIFICANT CHANGE UP (ref 135–145)
WBC # BLD: 6.8 K/UL — SIGNIFICANT CHANGE UP (ref 3.8–10.5)
WBC # FLD AUTO: 6.8 K/UL — SIGNIFICANT CHANGE UP (ref 3.8–10.5)

## 2017-08-19 RX ORDER — SODIUM CHLORIDE 9 MG/ML
1000 INJECTION INTRAMUSCULAR; INTRAVENOUS; SUBCUTANEOUS
Qty: 0 | Refills: 0 | Status: DISCONTINUED | OUTPATIENT
Start: 2017-08-19 | End: 2017-08-21

## 2017-08-19 RX ORDER — SERTRALINE 25 MG/1
100 TABLET, FILM COATED ORAL DAILY
Qty: 0 | Refills: 0 | Status: DISCONTINUED | OUTPATIENT
Start: 2017-08-19 | End: 2017-08-21

## 2017-08-19 RX ORDER — DIPHENHYDRAMINE HCL 50 MG
25 CAPSULE ORAL EVERY 6 HOURS
Qty: 0 | Refills: 0 | Status: DISCONTINUED | OUTPATIENT
Start: 2017-08-19 | End: 2017-08-21

## 2017-08-19 RX ADMIN — OXYCODONE AND ACETAMINOPHEN 1 TABLET(S): 5; 325 TABLET ORAL at 10:39

## 2017-08-19 RX ADMIN — HEPARIN SODIUM 5000 UNIT(S): 5000 INJECTION INTRAVENOUS; SUBCUTANEOUS at 14:12

## 2017-08-19 RX ADMIN — OXYCODONE AND ACETAMINOPHEN 1 TABLET(S): 5; 325 TABLET ORAL at 22:11

## 2017-08-19 RX ADMIN — Medication 100 MILLIGRAM(S): at 05:54

## 2017-08-19 RX ADMIN — OXYCODONE AND ACETAMINOPHEN 1 TABLET(S): 5; 325 TABLET ORAL at 16:30

## 2017-08-19 RX ADMIN — HEPARIN SODIUM 5000 UNIT(S): 5000 INJECTION INTRAVENOUS; SUBCUTANEOUS at 22:11

## 2017-08-19 RX ADMIN — HEPARIN SODIUM 5000 UNIT(S): 5000 INJECTION INTRAVENOUS; SUBCUTANEOUS at 05:53

## 2017-08-19 RX ADMIN — OXYCODONE AND ACETAMINOPHEN 1 TABLET(S): 5; 325 TABLET ORAL at 07:58

## 2017-08-19 RX ADMIN — OXYCODONE AND ACETAMINOPHEN 1 TABLET(S): 5; 325 TABLET ORAL at 05:56

## 2017-08-19 RX ADMIN — SERTRALINE 100 MILLIGRAM(S): 25 TABLET, FILM COATED ORAL at 11:41

## 2017-08-19 RX ADMIN — OXYCODONE AND ACETAMINOPHEN 1 TABLET(S): 5; 325 TABLET ORAL at 15:25

## 2017-08-19 RX ADMIN — Medication 100 MILLIGRAM(S): at 14:11

## 2017-08-19 RX ADMIN — OXYCODONE AND ACETAMINOPHEN 1 TABLET(S): 5; 325 TABLET ORAL at 09:52

## 2017-08-19 RX ADMIN — OXYCODONE AND ACETAMINOPHEN 1 TABLET(S): 5; 325 TABLET ORAL at 23:26

## 2017-08-19 RX ADMIN — SODIUM CHLORIDE 100 MILLILITER(S): 9 INJECTION INTRAMUSCULAR; INTRAVENOUS; SUBCUTANEOUS at 05:54

## 2017-08-19 NOTE — CHART NOTE - NSCHARTNOTEFT_GEN_A_CORE
Paged by nurse, Patient c/o left arm itchiness and rash when she woke up.  Patient seen at bedside. When I asked her she said she was feeling itchiness and rash in morning on left wrist. but now getting better. Patient denies SOB, Dizziness currently. Advised patient to report if she feel worsening of rash/itchiness or SOB later in the day.   PE: Vitals stable. Mild redness of left wrist, could be due to scratching but no specific rash noted on left arm. Patient has left arm bruise which as per patient was from ED when they took blood from left arm in ED.    Discussed with PGY 3

## 2017-08-19 NOTE — PROGRESS NOTE ADULT - SUBJECTIVE AND OBJECTIVE BOX
MEDICAL ATTENDING NOTE    Patient is a 42y old  Female who presents with a chief complaint of boil and redness on posterior right thigh (17 Aug 2017 11:04)      INTERVAL HPI/OVERNIGHT EVENTS: no new complaints    MEDICATIONS  (STANDING):  heparin  Injectable 5000 Unit(s) SubCutaneous every 8 hours  clindamycin IVPB 900 milliGRAM(s) IV Intermittent every 8 hours  dextrose 5%. 1000 milliLiter(s) (50 mL/Hr) IV Continuous <Continuous>  dextrose 50% Injectable 25 Gram(s) IV Push once  dextrose 50% Injectable 12.5 Gram(s) IV Push once  dextrose 50% Injectable 25 Gram(s) IV Push once  enalapril 2.5 milliGRAM(s) Oral daily  insulin lispro (HumaLOG) corrective regimen sliding scale   SubCutaneous three times a day before meals  sertraline 100 milliGRAM(s) Oral daily  sertraline 100 milliGRAM(s) Oral daily  sodium chloride 0.9%. 1000 milliLiter(s) (100 mL/Hr) IV Continuous <Continuous>    MEDICATIONS  (PRN):  acetaminophen   Tablet 650 milliGRAM(s) Oral every 6 hours PRN For Temp greater than 38.5 C (101.3 F)  oxyCODONE    5 mG/acetaminophen 325 mG 1 Tablet(s) Oral every 4 hours PRN Moderate Pain  morphine  - Injectable 4 milliGRAM(s) IV Push every 4 hours PRN Severe Pain  dextrose Gel 1 Dose(s) Oral once PRN Blood Glucose LESS THAN 70 milliGRAM(s)/deciLiter  glucagon  Injectable 1 milliGRAM(s) IntraMuscular once PRN Glucose <70 milliGRAM(s)/deciLiter      __________________________________________________  REVIEW OF SYSTEMS:    CONSTITUTIONAL: No fever,   EYES: no acute visual disturbances  RESPIRATORY: No cough; No shortness of breath  CARDIOVASCULAR: No chest pain, no palpitations  GASTROINTESTINAL: No abd. pain. No nausea or vomiting; No diarrhea   NEUROLOGICAL: No headache   MUSCULOSKELETAL: No joint pain, no muscle pain      ALL OTHER  ROS negative        Vital Signs Last 24 Hrs  T(C): 37 (19 Aug 2017 06:15), Max: 37.1 (18 Aug 2017 20:55)  T(F): 98.6 (19 Aug 2017 06:15), Max: 98.7 (18 Aug 2017 20:55)  HR: 73 (19 Aug 2017 06:15) (59 - 79)  BP: 97/63 (19 Aug 2017 06:15) (97/63 - 120/67)  BP(mean): 83 (18 Aug 2017 15:00) (79 - 86)  RR: 16 (19 Aug 2017 06:15) (16 - 24)  SpO2: 98% (19 Aug 2017 06:15) (96% - 100%)    ________________________________________________  PHYSICAL EXAM:  GENERAL: NAD  HEENT: Normocephalic;  conjunctivae and sclerae clear; moist mucous membranes;   NECK : supple  CHEST/LUNG: Clear to auscultation ; no wheezing  HEART: S1 S2  regular;   ABDOMEN: Soft, Nontender, Nondistended; Bowel sounds present  EXTREMITIES: no cyanosis; no edema; no calf tenderness  SKIN: warm and dry  NERVOUS SYSTEM:  Awake and alert; Oriented  to place, person and time ; no new deficits    _________________________________________________  LABS:                        11.3   6.8   )-----------( 297      ( 19 Aug 2017 07:08 )             35.8     08-19    141  |  106  |  13  ----------------------------<  109<H>  4.0   |  25  |  0.71    Ca    9.0      19 Aug 2017 07:08  Phos  3.8     08-18  Mg     2.1     08-18        Urinalysis Basic - ( 17 Aug 2017 11:39 )    Color: Yellow / Appearance: Clear / S.010 / pH: x  Gluc: x / Ketone: Negative  / Bili: Negative / Urobili: Negative   Blood: x / Protein: Negative / Nitrite: Negative   Leuk Esterase: Negative / RBC: 0-2 /HPF / WBC 0-2 /HPF   Sq Epi: x / Non Sq Epi: Few / Bacteria: x      CAPILLARY BLOOD GLUCOSE  111 (19 Aug 2017 07:54)  99 (18 Aug 2017 16:00)  99 (18 Aug 2017 13:46)            RADIOLOGY & ADDITIONAL TESTS:    Imaging Personally Reviewed:  YES    Consultant(s) Notes Reviewed:   YES    Care Discussed with Consultants :     Plan of care was discussed with patient ; all questions and concerns were addressed and care was aligned with patient's wishes. MEDICAL ATTENDING NOTE    Patient is a 42y old  Female who presents with a chief complaint of boil and redness on posterior right thigh (17 Aug 2017 11:04)      INTERVAL HPI/OVERNIGHT EVENTS: no new complaints    MEDICATIONS  (STANDING):  heparin  Injectable 5000 Unit(s) SubCutaneous every 8 hours  clindamycin IVPB 900 milliGRAM(s) IV Intermittent every 8 hours  dextrose 5%. 1000 milliLiter(s) (50 mL/Hr) IV Continuous <Continuous>  dextrose 50% Injectable 25 Gram(s) IV Push once  dextrose 50% Injectable 12.5 Gram(s) IV Push once  dextrose 50% Injectable 25 Gram(s) IV Push once  enalapril 2.5 milliGRAM(s) Oral daily  insulin lispro (HumaLOG) corrective regimen sliding scale   SubCutaneous three times a day before meals  sertraline 100 milliGRAM(s) Oral daily  sertraline 100 milliGRAM(s) Oral daily  sodium chloride 0.9%. 1000 milliLiter(s) (100 mL/Hr) IV Continuous <Continuous>    MEDICATIONS  (PRN):  acetaminophen   Tablet 650 milliGRAM(s) Oral every 6 hours PRN For Temp greater than 38.5 C (101.3 F)  oxyCODONE    5 mG/acetaminophen 325 mG 1 Tablet(s) Oral every 4 hours PRN Moderate Pain  morphine  - Injectable 4 milliGRAM(s) IV Push every 4 hours PRN Severe Pain  dextrose Gel 1 Dose(s) Oral once PRN Blood Glucose LESS THAN 70 milliGRAM(s)/deciLiter  glucagon  Injectable 1 milliGRAM(s) IntraMuscular once PRN Glucose <70 milliGRAM(s)/deciLiter      __________________________________________________  REVIEW OF SYSTEMS:    CONSTITUTIONAL: No fever,   EYES: no acute visual disturbances  RESPIRATORY: No cough; No shortness of breath  CARDIOVASCULAR: No chest pain, no palpitations  GASTROINTESTINAL: No abd. pain. No nausea or vomiting; No diarrhea   NEUROLOGICAL: No headache   MUSCULOSKELETAL: No joint pain, decreased thigh pain muscle pain      ALL OTHER  ROS negative        Vital Signs Last 24 Hrs  T(C): 37 (19 Aug 2017 06:15), Max: 37.1 (18 Aug 2017 20:55)  T(F): 98.6 (19 Aug 2017 06:15), Max: 98.7 (18 Aug 2017 20:55)  HR: 73 (19 Aug 2017 06:15) (59 - 79)  BP: 97/63 (19 Aug 2017 06:15) (97/63 - 120/67)  BP(mean): 83 (18 Aug 2017 15:00) (79 - 86)  RR: 16 (19 Aug 2017 06:15) (16 - 24)  SpO2: 98% (19 Aug 2017 06:15) (96% - 100%)    ________________________________________________  PHYSICAL EXAM:  GENERAL: NAD  HEENT: Normocephalic;  conjunctivae and sclerae clear; moist mucous membranes;   NECK : supple  CHEST/LUNG: Clear to auscultation ; no wheezing  HEART: S1 S2  regular;   ABDOMEN: Soft, Nontender, Nondistended; Bowel sounds present  EXTREMITIES: no cyanosis; no edema; no calf tenderness; dressing over wound on posterior aspect of right thigh  SKIN: warm and dry  NERVOUS SYSTEM:  Awake and alert; Oriented  to place, person and time ; no new deficits    _________________________________________________  LABS:                        11.3   6.8   )-----------( 297      ( 19 Aug 2017 07:08 )             35.8     08-    141  |  106  |  13  ----------------------------<  109<H>  4.0   |  25  |  0.71    Ca    9.0      19 Aug 2017 07:08  Phos  3.8     08-18  Mg     2.1     08-18        Urinalysis Basic - ( 17 Aug 2017 11:39 )    Color: Yellow / Appearance: Clear / S.010 / pH: x  Gluc: x / Ketone: Negative  / Bili: Negative / Urobili: Negative   Blood: x / Protein: Negative / Nitrite: Negative   Leuk Esterase: Negative / RBC: 0-2 /HPF / WBC 0-2 /HPF   Sq Epi: x / Non Sq Epi: Few / Bacteria: x      CAPILLARY BLOOD GLUCOSE  111 (19 Aug 2017 07:54)  99 (18 Aug 2017 16:00)  99 (18 Aug 2017 13:46)                Consultant(s) Notes Reviewed:   YES    Care Discussed with Consultants : surgery    Plan of care was discussed with patient ; all questions and concerns were addressed and care was aligned with patient's wishes.

## 2017-08-19 NOTE — PROGRESS NOTE ADULT - SUBJECTIVE AND OBJECTIVE BOX
Post Operative Day #: 0    SUBJECTIVE:42yFemale s/p I&D R thigh abscess; comfortable, voiding    Flatus: na           Bowel Movement: na    Pain Control Adequate:y     Nausea:n           Vomiting: n    Diet:shamar      MEDICATIONS  (STANDING):  heparin  Injectable 5000 Unit(s) SubCutaneous every 8 hours  clindamycin IVPB 900 milliGRAM(s) IV Intermittent every 8 hours  dextrose 5%. 1000 milliLiter(s) (50 mL/Hr) IV Continuous <Continuous>  dextrose 50% Injectable 25 Gram(s) IV Push once  dextrose 50% Injectable 12.5 Gram(s) IV Push once  dextrose 50% Injectable 25 Gram(s) IV Push once  enalapril 2.5 milliGRAM(s) Oral daily  insulin lispro (HumaLOG) corrective regimen sliding scale   SubCutaneous three times a day before meals  sertraline 100 milliGRAM(s) Oral daily    MEDICATIONS  (PRN):  acetaminophen   Tablet 650 milliGRAM(s) Oral every 6 hours PRN For Temp greater than 38.5 C (101.3 F)  oxyCODONE    5 mG/acetaminophen 325 mG 1 Tablet(s) Oral every 4 hours PRN Moderate Pain  morphine  - Injectable 4 milliGRAM(s) IV Push every 4 hours PRN Severe Pain  dextrose Gel 1 Dose(s) Oral once PRN Blood Glucose LESS THAN 70 milliGRAM(s)/deciLiter  glucagon  Injectable 1 milliGRAM(s) IntraMuscular once PRN Glucose <70 milliGRAM(s)/deciLiter      OBJECTIVE:  Vital Signs Last 24 Hrs  T(C): 36.9 (19 Aug 2017 00:01), Max: 37.1 (18 Aug 2017 20:55)  T(F): 98.5 (19 Aug 2017 00:01), Max: 98.7 (18 Aug 2017 20:55)  HR: 74 (19 Aug 2017 00:01) (59 - 79)  BP: 102/60 (19 Aug 2017 00:01) (102/60 - 120/67)  BP(mean): 83 (18 Aug 2017 15:00) (79 - 86)  RR: 16 (19 Aug 2017 00:01) (16 - 24)  SpO2: 96% (19 Aug 2017 00:01) (96% - 100%)  I&O's Detail    18 Aug 2017 07:01  -  19 Aug 2017 00:08  --------------------------------------------------------  IN:    Lactated Ringers IV Bolus: 150 mL  Total IN: 150 mL    OUT:  Total OUT: 0 mL    Total NET: 150 mL                                11.2   7.7   )-----------( 266      ( 18 Aug 2017 06:12 )             34.8     18 Aug 2017 06:12    143    |  109    |  15     ----------------------------<  109    3.8     |  24     |  0.67   16 Aug 2017 23:32    139    |  105    |  16     ----------------------------<  139    3.7     |  25     |  0.80     Ca    8.8        18 Aug 2017 06:12  Ca    9.1        16 Aug 2017 23:32  Phos  3.8       18 Aug 2017 06:12  Mg     2.1       18 Aug 2017 06:12    TPro  7.8    /  Alb  3.4    /  TBili  0.2    /  DBili  x      /  AST  13     /  ALT  19     /  AlkPhos  68     16 Aug 2017 23:32        Physical Exam:    RLE dressing CDI  stable post-op

## 2017-08-19 NOTE — PROGRESS NOTE ADULT - SUBJECTIVE AND OBJECTIVE BOX
INTERVAL HPI/OVERNIGHT EVENTS:  Pt resting comfortably. No acute complaints.   Tolerating incisional pain.    MEDICATIONS  (STANDING):  heparin  Injectable 5000 Unit(s) SubCutaneous every 8 hours  clindamycin IVPB 900 milliGRAM(s) IV Intermittent every 8 hours  dextrose 5%. 1000 milliLiter(s) (50 mL/Hr) IV Continuous <Continuous>  dextrose 50% Injectable 25 Gram(s) IV Push once  dextrose 50% Injectable 12.5 Gram(s) IV Push once  dextrose 50% Injectable 25 Gram(s) IV Push once  enalapril 2.5 milliGRAM(s) Oral daily  insulin lispro (HumaLOG) corrective regimen sliding scale   SubCutaneous three times a day before meals  sertraline 100 milliGRAM(s) Oral daily  sertraline 100 milliGRAM(s) Oral daily  sodium chloride 0.9%. 1000 milliLiter(s) (100 mL/Hr) IV Continuous <Continuous>    MEDICATIONS  (PRN):  acetaminophen   Tablet 650 milliGRAM(s) Oral every 6 hours PRN For Temp greater than 38.5 C (101.3 F)  oxyCODONE    5 mG/acetaminophen 325 mG 1 Tablet(s) Oral every 4 hours PRN Moderate Pain  morphine  - Injectable 4 milliGRAM(s) IV Push every 4 hours PRN Severe Pain  dextrose Gel 1 Dose(s) Oral once PRN Blood Glucose LESS THAN 70 milliGRAM(s)/deciLiter  glucagon  Injectable 1 milliGRAM(s) IntraMuscular once PRN Glucose <70 milliGRAM(s)/deciLiter      Vital Signs Last 24 Hrs  T(C): 37 (19 Aug 2017 06:15), Max: 37.1 (18 Aug 2017 20:55)  T(F): 98.6 (19 Aug 2017 06:15), Max: 98.7 (18 Aug 2017 20:55)  HR: 69 (19 Aug 2017 09:54) (59 - 79)  BP: 110/70 (19 Aug 2017 09:54) (97/63 - 120/67)  BP(mean): 83 (18 Aug 2017 15:00) (79 - 86)  RR: 16 (19 Aug 2017 06:15) (16 - 24)  SpO2: 98% (19 Aug 2017 06:15) (96% - 100%)    Physical:  General: A&Ox3. NAD.  RLE: Warm, NVI. Posterior thigh wound clean, dry. No active drainage. Much improved surrounding erythema.    I&O's Detail    18 Aug 2017 07:01  -  19 Aug 2017 07:00  --------------------------------------------------------  IN:    Lactated Ringers IV Bolus: 150 mL  Total IN: 150 mL    OUT:  Total OUT: 0 mL    Total NET: 150 mL          LABS:                        11.3   6.8   )-----------( 297      ( 19 Aug 2017 07:08 )             35.8             08-19    141  |  106  |  13  ----------------------------<  109<H>  4.0   |  25  |  0.71    Ca    9.0      19 Aug 2017 07:08  Phos  3.8     08-18  Mg     2.1     08-18

## 2017-08-19 NOTE — PROGRESS NOTE ADULT - SUBJECTIVE AND OBJECTIVE BOX
41 yo F from home with PMH HTN, DM, and depression presented c/o boil and redness on posterior R thigh which started , day after she went to [employer]. Pt went to her PCP on monday, given levaquin 750mg for 7 days. Pt returned to PCP on wednesday due to worsening infection, referred to ED. Pt has no difficulty ambulating, no fevers or chills. She reports having boils in the past under her armpits and back.  Today pt reports feeling better, less pain, no other complaints. Indurated area outlined by ED improved.    ROS:  Constitutional - denies fevers, chills, headaches, appetite changes.  Cardio - denies chest pain, palpitations, SOB.  Resp - denies cough, sneezing, SOB  GI - denies abdominal pain, nausea, vomiting, constipation, diarrhea.   - denies dysuria, hematuria.  MSK - denies joint pain, muscle pain, edema.  Neuro - denies weakness, numbness, tingling.  Skin - boil and redness on posterior R thigh. denies rashes.    MEDICATIONS  (STANDING):  heparin  Injectable 5000 Unit(s) SubCutaneous every 8 hours  clindamycin IVPB 900 milliGRAM(s) IV Intermittent every 8 hours  dextrose 5%. 1000 milliLiter(s) (50 mL/Hr) IV Continuous <Continuous>  dextrose 50% Injectable 25 Gram(s) IV Push once  dextrose 50% Injectable 12.5 Gram(s) IV Push once  dextrose 50% Injectable 25 Gram(s) IV Push once  enalapril 2.5 milliGRAM(s) Oral daily  insulin lispro (HumaLOG) corrective regimen sliding scale   SubCutaneous three times a day before meals  sertraline 100 milliGRAM(s) Oral daily  sertraline 100 milliGRAM(s) Oral daily  sodium chloride 0.9%. 1000 milliLiter(s) (100 mL/Hr) IV Continuous <Continuous>    MEDICATIONS  (PRN):  acetaminophen   Tablet 650 milliGRAM(s) Oral every 6 hours PRN For Temp greater than 38.5 C (101.3 F)  oxyCODONE    5 mG/acetaminophen 325 mG 1 Tablet(s) Oral every 4 hours PRN Moderate Pain  morphine  - Injectable 4 milliGRAM(s) IV Push every 4 hours PRN Severe Pain  dextrose Gel 1 Dose(s) Oral once PRN Blood Glucose LESS THAN 70 milliGRAM(s)/deciLiter  glucagon  Injectable 1 milliGRAM(s) IntraMuscular once PRN Glucose <70 milliGRAM(s)/deciLiter    Vital Signs Last 24 Hrs  T(C): 37 (19 Aug 2017 06:15), Max: 37.1 (18 Aug 2017 20:55)  T(F): 98.6 (19 Aug 2017 06:15), Max: 98.7 (18 Aug 2017 20:55)  HR: 69 (19 Aug 2017 09:54) (59 - 79)  BP: 110/70 (19 Aug 2017 09:54) (97/63 - 120/67)  BP(mean): 83 (18 Aug 2017 15:00) (79 - 86)  RR: 16 (19 Aug 2017 06:15) (16 - 24)  SpO2: 98% (19 Aug 2017 06:15) (96% - 100%)    PE  General - 41 yo obese female appears well developed, well nourished, laying comfortably in NAD.  HEENT - NCAT  Neck - supple, no LAD, no JVD.  Cardio - RRR, +S1S2, no m/r/g.  Resp - CTA b/l, normal respiratory effort, no w/r/r.  Abd - soft, NTND, bowel sounds present, no CVA tenderness.  Extremities - no clubbing, cyanosis, or edema. peripheral pulses present bilaterally. Large abscess with discharge and surrounding cellulitis on posterior R thigh, tender, edematous, and erythematous.  Neuro - AOx3, 5/5 muscle strength, sensation intact.    Labs:             CBC Full  -  ( 19 Aug 2017 07:08 )  WBC Count : 6.8 K/uL  Hemoglobin : 11.3 g/dL  Hematocrit : 35.8 %  Platelet Count - Automated : 297 K/uL  Mean Cell Volume : 85.3 fl  Mean Cell Hemoglobin : 26.9 pg  Mean Cell Hemoglobin Concentration : 31.5 gm/dL  Auto Neutrophil # : 3.6 K/uL  Auto Lymphocyte # : 2.2 K/uL  Auto Monocyte # : 0.6 K/uL  Auto Eosinophil # : 0.3 K/uL  Auto Basophil # : 0.1 K/uL  Auto Neutrophil % : 52.9 %  Auto Lymphocyte % : 33.2 %  Auto Monocyte % : 8.2 %  Auto Eosinophil % : 4.3 %  Auto Basophil % : 1.4 %        141  |  106  |  13  ----------------------------<  109<H>  4.0   |  25  |  0.71    Ca    9.0      19 Aug 2017 07:08  Phos  3.8       Mg     2.1             Urinalysis Basic - ( 17 Aug 2017 11:39 )    Color: Yellow / Appearance: Clear / S.010 / pH: x  Gluc: x / Ketone: Negative  / Bili: Negative / Urobili: Negative   Blood: x / Protein: Negative / Nitrite: Negative   Leuk Esterase: Negative / RBC: 0-2 /HPF / WBC 0-2 /HPF   Sq Epi: x / Non Sq Epi: Few / Bacteria: x    Imagin/17/17 CT R femur:  IMPRESSION:    Mild cellulitic change in the right mid posterior thigh. No fluid   collection.    17 CXR:  IMPRESSION: No evidence for focal infiltrate or lobar consolidation.

## 2017-08-20 LAB
-  AMPICILLIN/SULBACTAM: SIGNIFICANT CHANGE UP
-  CEFAZOLIN: SIGNIFICANT CHANGE UP
-  CIPROFLOXACIN: SIGNIFICANT CHANGE UP
-  CLINDAMYCIN: SIGNIFICANT CHANGE UP
-  ERYTHROMYCIN: SIGNIFICANT CHANGE UP
-  GENTAMICIN: SIGNIFICANT CHANGE UP
-  LEVOFLOXACIN: SIGNIFICANT CHANGE UP
-  MOXIFLOXACIN(AEROBIC): SIGNIFICANT CHANGE UP
-  OXACILLIN: SIGNIFICANT CHANGE UP
-  RIFAMPIN: SIGNIFICANT CHANGE UP
-  TETRACYCLINE: SIGNIFICANT CHANGE UP
-  TRIMETHOPRIM/SULFAMETHOXAZOLE: SIGNIFICANT CHANGE UP
-  VANCOMYCIN: SIGNIFICANT CHANGE UP
METHOD TYPE: SIGNIFICANT CHANGE UP

## 2017-08-20 PROCEDURE — 99233 SBSQ HOSP IP/OBS HIGH 50: CPT | Mod: GC

## 2017-08-20 RX ORDER — CALAMINE 8% AND ZINC OXIDE 8% 160 MG/ML
1 LOTION TOPICAL THREE TIMES A DAY
Qty: 0 | Refills: 0 | Status: DISCONTINUED | OUTPATIENT
Start: 2017-08-20 | End: 2017-08-21

## 2017-08-20 RX ORDER — DIPHENHYDRAMINE HCL 50 MG
25 CAPSULE ORAL ONCE
Qty: 0 | Refills: 0 | Status: COMPLETED | OUTPATIENT
Start: 2017-08-20 | End: 2017-08-20

## 2017-08-20 RX ADMIN — Medication 100 MILLIGRAM(S): at 21:16

## 2017-08-20 RX ADMIN — HEPARIN SODIUM 5000 UNIT(S): 5000 INJECTION INTRAVENOUS; SUBCUTANEOUS at 13:47

## 2017-08-20 RX ADMIN — OXYCODONE AND ACETAMINOPHEN 1 TABLET(S): 5; 325 TABLET ORAL at 13:42

## 2017-08-20 RX ADMIN — OXYCODONE AND ACETAMINOPHEN 1 TABLET(S): 5; 325 TABLET ORAL at 20:30

## 2017-08-20 RX ADMIN — OXYCODONE AND ACETAMINOPHEN 1 TABLET(S): 5; 325 TABLET ORAL at 11:53

## 2017-08-20 RX ADMIN — SERTRALINE 100 MILLIGRAM(S): 25 TABLET, FILM COATED ORAL at 11:14

## 2017-08-20 RX ADMIN — OXYCODONE AND ACETAMINOPHEN 1 TABLET(S): 5; 325 TABLET ORAL at 19:56

## 2017-08-20 RX ADMIN — Medication 100 MILLIGRAM(S): at 16:01

## 2017-08-20 RX ADMIN — CALAMINE 8% AND ZINC OXIDE 8% 1 APPLICATION(S): 160 LOTION TOPICAL at 04:46

## 2017-08-20 RX ADMIN — Medication 25 MILLIGRAM(S): at 04:46

## 2017-08-20 RX ADMIN — HEPARIN SODIUM 5000 UNIT(S): 5000 INJECTION INTRAVENOUS; SUBCUTANEOUS at 06:00

## 2017-08-20 RX ADMIN — HEPARIN SODIUM 5000 UNIT(S): 5000 INJECTION INTRAVENOUS; SUBCUTANEOUS at 21:16

## 2017-08-20 RX ADMIN — Medication 25 MILLIGRAM(S): at 01:04

## 2017-08-20 NOTE — PROGRESS NOTE ADULT - ATTENDING COMMENTS
Patient seen/evaluated at bedside. I agree with the resident progress note/outlined plan of care. My independent findings and conclusions are documented.
Patient was seen and examined by myself with team. Case was discussed with house staff in details.
Patient seen/evaluated at bedside 8/18 pre and post I&D. All questions answered at bedside. I agree with the resident progress note/outlined plan of care. My independent findings and conclusions are documented.    1. posterior right leg cellulitis with abscess  2. DM T II  3. HTN    POD#0 s/p I&D of the right posterior thigh abscess, dressings now in place  - continue with clindamycin,  -area affected demarcated and appears to be slightly receding  -f/u surgical cultures, likely dc on doxycline  percocet for pain prn
Plan discussed with patient in details at bedside and all her questions / concerns were addressed.
Patient seen/evaluated at bedside 8/18/17 pre and post I&D of right leg abscess. Questions answered at bedside.. I agree with the resident progress note/outlined plan of care. My independen findings and conclusions are documented.    1. posterior right leg cellulitis with abscess  2. DM T II  3. HTN    POD#0 s/p I&D of the right posterior thigh abscess, dressings now in place  - continue with clindamycin,  -area affected demarcated and appears to be slightly receding  -f/u surgical cultures, likely dc on doxycline  percocet for pain prn

## 2017-08-20 NOTE — PROGRESS NOTE ADULT - SUBJECTIVE AND OBJECTIVE BOX
42y Female    Meds:    Allergies    penicillin (Rash)  Rocephin (Fever; Short breath)    Intolerances        VITALS:  Vital Signs Last 24 Hrs  T(C): 37 (20 Aug 2017 14:07), Max: 37.4 (19 Aug 2017 21:30)  T(F): 98.6 (20 Aug 2017 14:07), Max: 99.4 (19 Aug 2017 21:30)  HR: 69 (20 Aug 2017 14:07) (58 - 71)  BP: 131/80 (20 Aug 2017 14:07) (108/69 - 131/80)  BP(mean): --  RR: 16 (20 Aug 2017 14:07) (16 - 17)  SpO2: 95% (20 Aug 2017 14:07) (95% - 96%)    LABS/DIAGNOSTIC TESTS:                          11.3   6.8   )-----------( 297      ( 19 Aug 2017 07:08 )             35.8         08-19    141  |  106  |  13  ----------------------------<  109<H>  4.0   |  25  |  0.71    Ca    9.0      19 Aug 2017 07:08            CULTURES: Culture - Surgical Swab (08.18.17 @ 17:40)    Specimen Source: .Surgical Swab RIGHT THIGH ABSCESS    Culture Results:   Few Staphylococcus aureus          RADIOLOGY:      ROS:  [  ] UNABLE TO ELICIT 42y Female who is s/p I&D of right thigh abscess, she has no fevers but has pain in right thigh still but less, she did c/o of a rash on her chest and arms which was itchy yesterday but not there today. Her abscess culture is growing  out staph aureus, sensitivities pending.    Meds:    Allergies    penicillin (Rash)  Rocephin (Fever; Short breath)    Intolerances        VITALS:  Vital Signs Last 24 Hrs  T(C): 37 (20 Aug 2017 14:07), Max: 37.4 (19 Aug 2017 21:30)  T(F): 98.6 (20 Aug 2017 14:07), Max: 99.4 (19 Aug 2017 21:30)  HR: 69 (20 Aug 2017 14:07) (58 - 71)  BP: 131/80 (20 Aug 2017 14:07) (108/69 - 131/80)  BP(mean): --  RR: 16 (20 Aug 2017 14:07) (16 - 17)  SpO2: 95% (20 Aug 2017 14:07) (95% - 96%)    LABS/DIAGNOSTIC TESTS:                          11.3   6.8   )-----------( 297      ( 19 Aug 2017 07:08 )             35.8         08-19    141  |  106  |  13  ----------------------------<  109<H>  4.0   |  25  |  0.71    Ca    9.0      19 Aug 2017 07:08            CULTURES: Culture - Surgical Swab (08.18.17 @ 17:40)    Specimen Source: .Surgical Swab RIGHT THIGH ABSCESS    Culture Results:   Few Staphylococcus aureus          RADIOLOGY:      ROS:  [  ] UNABLE TO ELICIT

## 2017-08-20 NOTE — PROGRESS NOTE ADULT - PROBLEM SELECTOR PLAN 1
s/p I& D of he right posterior thigh abscess, dressings now in place, POD #1  pt afebrile and no leukocytosis.  CT - mild cellulitic changes.  Blood cultures - no growth to date.  Surgical wound culture ordered.  on IV Clindamycin 900mg q8.  Tylenol/Percocet PRN pain.  ID consult - likely start doxycyline on discharge.  Hepatitis panel and HIV tests obtained d/t history of previous boils.
s/p I& D of he right posterior thigh abscess, dressings now in place, POD #2  pt afebrile and no leukocytosis.  CT - mild cellulitic changes.  Blood cultures - no growth to date.  Surgical wound culture pending--> prelim with few staph aureus, await sensitivities  on IV Clindamycin 900mg q8.  Tylenol/Percocet PRN pain.  ID consult - likely start doxycyline on discharge.  Hepatitis panel and HIV tests obtained d/t history of previous boils.
Complete abx course  Pain control prn  Pack daily with iodoform, cover with Allevyn. Recommend shower and wash area with soap and warm water before dressing changes.   f/u with Dr. Spring in office in 1 week after discharge [phone]
S/P I& D of the right posterior thigh abscess, dressings now in place, POD #1  pt afebrile and feels well with less pain  Blood cultures - no growth to date.  Continue on IV Clindamycin 900mg q8.  Tylenol and Percocet PRN pain.
to OR for I&D of abscess today.  pt afebrile and no leukocytosis.  CT - mild cellulitic changes.  Blood cultures - no growth to date.  Surgical wound culture ordered.  on IV Clindamycin 900mg q8.  Tylenol/Percocet PRN pain.  ID consult - likely start doxycyline on discharge.  Hepatitis panel and HIV tests obtained d/t history of previous boils.

## 2017-08-20 NOTE — PROGRESS NOTE ADULT - PROBLEM SELECTOR PLAN 4
on home dose of Zoloft 100mg daily.

## 2017-08-20 NOTE — PROGRESS NOTE ADULT - PROBLEM SELECTOR PLAN 2
HbA1c 6.  on insulin sliding scale.  will monitor glucose levels and adjust medications accordingly.
HbA1c 6.  on insulin sliding scale.  will monitor glucose levels and adjust medications accordingly.
stable; monitor f/s ; continue insulin
HbA1c 6.  on insulin sliding scale.  will monitor glucose levels and adjust medications accordingly.

## 2017-08-20 NOTE — PROGRESS NOTE ADULT - PROBLEM SELECTOR PROBLEM 2
DM (diabetes mellitus)

## 2017-08-20 NOTE — PROGRESS NOTE ADULT - SUBJECTIVE AND OBJECTIVE BOX
Patient is a 42y old  Female who presents with a chief complaint of boil and redness on posterior right thigh (17 Aug 2017 11:04)      INTERVAL HPI/OVERNIGHT EVENTS: Pt reports pain much improved at right posterior thigh. No fevers/chills/nausea/vomiting, diarrhea  Overnight, had pruritic rash that appeared on b/l arms and chest which disappeared after benadryl administration not clearly associated with a specific drug aministration. No recurrence of rash or pruritis.     MEDICATIONS  (STANDING):  heparin  Injectable 5000 Unit(s) SubCutaneous every 8 hours  clindamycin IVPB 900 milliGRAM(s) IV Intermittent every 8 hours  dextrose 5%. 1000 milliLiter(s) (50 mL/Hr) IV Continuous <Continuous>  dextrose 50% Injectable 25 Gram(s) IV Push once  dextrose 50% Injectable 12.5 Gram(s) IV Push once  dextrose 50% Injectable 25 Gram(s) IV Push once  enalapril 2.5 milliGRAM(s) Oral daily  insulin lispro (HumaLOG) corrective regimen sliding scale   SubCutaneous three times a day before meals  sertraline 100 milliGRAM(s) Oral daily  sertraline 100 milliGRAM(s) Oral daily  sodium chloride 0.9%. 1000 milliLiter(s) (100 mL/Hr) IV Continuous <Continuous>    MEDICATIONS  (PRN):  acetaminophen   Tablet 650 milliGRAM(s) Oral every 6 hours PRN For Temp greater than 38.5 C (101.3 F)  oxyCODONE    5 mG/acetaminophen 325 mG 1 Tablet(s) Oral every 4 hours PRN Moderate Pain  morphine  - Injectable 4 milliGRAM(s) IV Push every 4 hours PRN Severe Pain  dextrose Gel 1 Dose(s) Oral once PRN Blood Glucose LESS THAN 70 milliGRAM(s)/deciLiter  glucagon  Injectable 1 milliGRAM(s) IntraMuscular once PRN Glucose <70 milliGRAM(s)/deciLiter      __________________________________________________  REVIEW OF SYSTEMS:    CONSTITUTIONAL: No fever,   EYES: no acute visual disturbances  RESPIRATORY: No cough; No shortness of breath  CARDIOVASCULAR: No chest pain, no palpitations  GASTROINTESTINAL: No abd. pain. No nausea or vomiting; No diarrhea   NEUROLOGICAL: No headache   MUSCULOSKELETAL: No joint pain, decreased thigh pain muscle pain      ALL OTHER  ROS negative      VITALS:  Vital Signs Last 24 Hrs  T(C): 37 (20 Aug 2017 14:07), Max: 37.4 (19 Aug 2017 21:30)  T(F): 98.6 (20 Aug 2017 14:07), Max: 99.4 (19 Aug 2017 21:30)  HR: 69 (20 Aug 2017 14:07) (58 - 71)  BP: 131/80 (20 Aug 2017 14:07) (108/69 - 131/80)  BP(mean): --  RR: 16 (20 Aug 2017 14:07) (16 - 17)  SpO2: 95% (20 Aug 2017 14:07) (95% - 96%)    LABS/DIAGNOSTIC TESTS    AAOx3 NAD non toxic appearing  moist mucous membranes  neck: supple  S1S2 RRR no murmurs/rubs/gallops  CTAB/l no wheezes/rhonchi, crackles  soft, NT, ND, + BS no guarding/rebound  receding erythema at posterior right leg, dressing with packing in place, mild tenderness to palpations  CN II-XII grossly intact, moves all extremities symmetrically

## 2017-08-20 NOTE — PROGRESS NOTE ADULT - PROBLEM SELECTOR PROBLEM 1
Cellulitis and abscess
Thigh abscess
Cellulitis and abscess

## 2017-08-20 NOTE — PROGRESS NOTE ADULT - ASSESSMENT
right thigh abscess  right thigh cellulitis  plan - restart clindamycin 900mgs iv q 8 hrs  will switch to po abxs tomorrow once we have sensitivities and plan to dc home tomorrow.

## 2017-08-20 NOTE — PROGRESS NOTE ADULT - PROBLEM SELECTOR PLAN 3
BP stable at 110/74 today.  on home dose of enalapril 2.5mg daily.  will monitor BP and adjust medications accordingly.
BP stable at 110/74 today.  on home dose of enalapril 2.5mg daily.  will monitor BP and adjust medications accordingly.
ambulatory.   exercise as tolerated
BP stable at 110/74 today.  on home dose of enalapril 2.5mg daily.  will monitor BP and adjust medications accordingly.

## 2017-08-21 VITALS
TEMPERATURE: 97 F | RESPIRATION RATE: 16 BRPM | DIASTOLIC BLOOD PRESSURE: 73 MMHG | HEART RATE: 72 BPM | OXYGEN SATURATION: 97 % | SYSTOLIC BLOOD PRESSURE: 120 MMHG

## 2017-08-21 PROCEDURE — 84702 CHORIONIC GONADOTROPIN TEST: CPT

## 2017-08-21 PROCEDURE — 80074 ACUTE HEPATITIS PANEL: CPT

## 2017-08-21 PROCEDURE — 71045 X-RAY EXAM CHEST 1 VIEW: CPT

## 2017-08-21 PROCEDURE — 88304 TISSUE EXAM BY PATHOLOGIST: CPT

## 2017-08-21 PROCEDURE — 83735 ASSAY OF MAGNESIUM: CPT

## 2017-08-21 PROCEDURE — 82784 ASSAY IGA/IGD/IGG/IGM EACH: CPT

## 2017-08-21 PROCEDURE — 99239 HOSP IP/OBS DSCHRG MGMT >30: CPT

## 2017-08-21 PROCEDURE — 96374 THER/PROPH/DIAG INJ IV PUSH: CPT | Mod: XU

## 2017-08-21 PROCEDURE — 85652 RBC SED RATE AUTOMATED: CPT

## 2017-08-21 PROCEDURE — 86140 C-REACTIVE PROTEIN: CPT

## 2017-08-21 PROCEDURE — 81025 URINE PREGNANCY TEST: CPT

## 2017-08-21 PROCEDURE — 99285 EMERGENCY DEPT VISIT HI MDM: CPT | Mod: 25

## 2017-08-21 PROCEDURE — 83036 HEMOGLOBIN GLYCOSYLATED A1C: CPT

## 2017-08-21 PROCEDURE — 80048 BASIC METABOLIC PNL TOTAL CA: CPT

## 2017-08-21 PROCEDURE — 81001 URINALYSIS AUTO W/SCOPE: CPT

## 2017-08-21 PROCEDURE — 73701 CT LOWER EXTREMITY W/DYE: CPT

## 2017-08-21 PROCEDURE — 86790 VIRUS ANTIBODY NOS: CPT

## 2017-08-21 PROCEDURE — 86901 BLOOD TYPING SEROLOGIC RH(D): CPT

## 2017-08-21 PROCEDURE — 86850 RBC ANTIBODY SCREEN: CPT

## 2017-08-21 PROCEDURE — 87070 CULTURE OTHR SPECIMN AEROBIC: CPT

## 2017-08-21 PROCEDURE — 84443 ASSAY THYROID STIM HORMONE: CPT

## 2017-08-21 PROCEDURE — 80061 LIPID PANEL: CPT

## 2017-08-21 PROCEDURE — 86900 BLOOD TYPING SEROLOGIC ABO: CPT

## 2017-08-21 PROCEDURE — 85027 COMPLETE CBC AUTOMATED: CPT

## 2017-08-21 PROCEDURE — 96375 TX/PRO/DX INJ NEW DRUG ADDON: CPT

## 2017-08-21 PROCEDURE — 87040 BLOOD CULTURE FOR BACTERIA: CPT

## 2017-08-21 PROCEDURE — 93005 ELECTROCARDIOGRAM TRACING: CPT

## 2017-08-21 PROCEDURE — 84100 ASSAY OF PHOSPHORUS: CPT

## 2017-08-21 PROCEDURE — 87389 HIV-1 AG W/HIV-1&-2 AB AG IA: CPT

## 2017-08-21 PROCEDURE — 87186 SC STD MICRODIL/AGAR DIL: CPT

## 2017-08-21 PROCEDURE — 80053 COMPREHEN METABOLIC PANEL: CPT

## 2017-08-21 RX ORDER — CIPROFLOXACIN LACTATE 400MG/40ML
1 VIAL (ML) INTRAVENOUS
Qty: 0 | Refills: 0 | COMMUNITY

## 2017-08-21 RX ADMIN — Medication 25 MILLIGRAM(S): at 00:03

## 2017-08-21 RX ADMIN — Medication 2.5 MILLIGRAM(S): at 05:04

## 2017-08-21 RX ADMIN — Medication 100 MILLIGRAM(S): at 13:33

## 2017-08-21 RX ADMIN — Medication 100 MILLIGRAM(S): at 05:04

## 2017-08-21 RX ADMIN — OXYCODONE AND ACETAMINOPHEN 1 TABLET(S): 5; 325 TABLET ORAL at 10:44

## 2017-08-21 RX ADMIN — HEPARIN SODIUM 5000 UNIT(S): 5000 INJECTION INTRAVENOUS; SUBCUTANEOUS at 13:31

## 2017-08-21 RX ADMIN — OXYCODONE AND ACETAMINOPHEN 1 TABLET(S): 5; 325 TABLET ORAL at 11:45

## 2017-08-21 RX ADMIN — HEPARIN SODIUM 5000 UNIT(S): 5000 INJECTION INTRAVENOUS; SUBCUTANEOUS at 05:04

## 2017-08-21 RX ADMIN — CALAMINE 8% AND ZINC OXIDE 8% 1 APPLICATION(S): 160 LOTION TOPICAL at 00:03

## 2017-08-21 NOTE — DISCHARGE NOTE ADULT - CARE PROVIDER_API CALL
Gurdeep Spring (MD), Surgery  [address]  [address]  [place] [address]  Phone: [phone]  Fax: [phone]

## 2017-08-21 NOTE — PROGRESS NOTE ADULT - PROVIDER SPECIALTY LIST ADULT
Hospitalist
Infectious Disease
Infectious Disease
Internal Medicine
Surgery
Surgery
Internal Medicine

## 2017-08-21 NOTE — DISCHARGE NOTE ADULT - MEDICATION SUMMARY - MEDICATIONS TO CHANGE
I will SWITCH the dose or number of times a day I take the medications listed below when I get home from the hospital:    levoFLOXacin 750 mg oral tablet  -- 1 tab(s) by mouth every 24 hours x 7 days , starting 8/14

## 2017-08-21 NOTE — DISCHARGE NOTE ADULT - HOSPITAL COURSE
Patient is a 42 year-old female from home with PMH of HTN, DM type II, depression who presented with a boil/erythema of right posterior thigh. She had begun a course of levaquin on 8/14/17 without resolution of symptoms. She was admitted for abscess and surrounding cellulitis. She had an incision and drainage of the abscess on 8/19/17 and was placed on  IV clindamycin. Hepatitis and HIV tests were negative. Patient denies history of previous boils. Blood cultures are negative. Surgical culture of abscess shows pan-sensitive staph aureus.     With regards to her diabetes, patient takes metformin 500mg twice daily at home. It was held during admission and she was given humalog sliding scale with appropriate glycemic control. Hemoglobin A1c was found to be 6/0%. She will be restarted on her home metformin upon discharge today.    Home enalapril for hypertension and sertraline for depression were continued throughout hospital course. Patient is a 42 year-old female from home with PMH of HTN, DM type II, depression who presented with a boil/erythema of right posterior thigh. She had begun a course of levaquin on 8/14/17 without resolution of symptoms. She was admitted for abscess and surrounding cellulitis. She had an incision and drainage of the abscess on 8/19/17 and was placed on  IV clindamycin. Hepatitis and HIV tests were negative. Patient denies history of previous boils. Blood cultures are negative. Surgical culture of abscess shows pan-sensitive staph aureus.     With regards to her diabetes, patient takes metformin 500mg twice daily at home. It was held during admission and she was given humalog sliding scale with appropriate glycemic control. Hemoglobin A1c was found to be 6/0%. She will be restarted on her home metformin upon discharge today.    Home enalapril for hypertension and sertraline for depression were continued throughout hospital course.     She is medically stable for discharge with levaquin 750mg once daily for 7 days. She will follow up with surgeon, Dr. Spring, within 1 week of discharge. She will have VNS see her at home for wound care. Patient is a 42 year-old female from home with PMH of HTN, DM type II, depression who presented with a boil/erythema of right posterior thigh. She had begun a course of levaquin on 8/14/17 without resolution of symptoms. She was admitted for abscess and surrounding cellulitis. She had an incision and drainage of the abscess on 8/19/17 and was placed on  IV clindamycin. Hepatitis and HIV tests were negative. Patient denies history of previous boils. Blood cultures are negative. Surgical culture of abscess shows pan-sensitive staph aureus.     With regards to her diabetes, patient takes metformin 500mg twice daily at home. It was held during admission and she was given humalog sliding scale with appropriate glycemic control. Hemoglobin A1c was found to be 6/0%. She will be restarted on her home metformin upon discharge today.    Home enalapril for hypertension and sertraline for depression were continued throughout hospital course.     She is medically stable for discharge with levaquin 750mg once daily for 7 days. She will follow up with surgeon, Dr. Spring, within 1 week of discharge. She will have VNS see her at home for wound care.     Med Attd Addendum  Met with patient and spouse at bedside. Reviewed culture data and plan of care. RLE s/p I&D with significant improvement in RLE edema. Pt to complete 7 days of abx with levofloxacin. Question aswered Patient referred for home care services.  35 min spent on coordination of care, discharge planning,

## 2017-08-21 NOTE — DISCHARGE NOTE ADULT - PLAN OF CARE
to drain and treat with antibiotics. You had an abscess on the back of your right thigh which was drained on 8/18/17. You were then placed on IV antibiotics. Please take levaquin (levofloxacin) 750mg once daily for 7 days. Please follow up with surgeon, Dr. Gurdeep Spring, within 1 week of discharge (information to make an appointment is included in this packet).     Wound care instructions are as follows: Shower, wash area with soap and water, pat dry before dressing changes. Pack daily with iodoform, cover with Allevyn.   Pack daily with iodoform, cover with Allevyn. Recommend shower and wash area with soap and warm water before dressing changes. Please continue metformin 500mg twice daily. Check fingerstick glucose three times daily before meals and at bedtime. Follow up with PCP between 3-5 days of discharge. Please continue enalapril 2.5mg once daily. Follow up with PCP within 3-5 days of discharge. Continue sertraline 100mg daily. Follow up with PCP within 3-5 days of discharge.

## 2017-08-21 NOTE — PROGRESS NOTE ADULT - SUBJECTIVE AND OBJECTIVE BOX
42y Female    REVIEW OF SYSTEMS:  [  ] Not able to illicit  General:	  Chest:	  GI:	  :  Skin:	  Musculoskeletal:	  Neuro:	    MEDS:  clindamycin IVPB 900 milliGRAM(s) IV Intermittent every 8 hours    ALLERGIES: penicillin (Rash)  Rocephin (Fever; Short breath)    VITALS:  Vital Signs Last 24 Hrs  T(C): 36.6 (21 Aug 2017 05:08), Max: 37 (20 Aug 2017 14:07)  T(F): 97.9 (21 Aug 2017 05:08), Max: 98.6 (20 Aug 2017 14:07)  HR: 67 (21 Aug 2017 05:08) (62 - 69)  BP: 109/78 (21 Aug 2017 05:08) (109/78 - 134/71)  BP(mean): --  RR: 16 (21 Aug 2017 05:08) (16 - 16)  SpO2: 96% (21 Aug 2017 05:08) (95% - 96%)    PHYSICAL EXAM:  HEENT:  Neck:  Respiratory:  Cardiovascular:  Gastrointestinal:  Extremities:  Skin:  Ortho:  Neuro:    LABS/DIAGNOSTIC TESTS:  No new labs    CULTURES:   Culture - Surgical Swab (08.18.17 @ 17:40)    -  Ampicillin/Sulbactam: S <=8/4    -  Clindamycin: S <=0.25    -  Erythromycin: S <=0.25    -  RIF- Rifampin: S <=1    -  Gentamicin: S <=1    -  Ciprofloxacin: S <=1    -  Levofloxacin: S <=0.5    -  Trimethoprim/Sulfamethoxazole: S <=0.5/9.5    -  Vancomycin: S 2    -  Cefazolin: S <=4    -  Moxifloxacin(Aerobic): S <=0.5    -  Oxacillin: S <=0.25    -  Tetra/Doxy: S <=1    Specimen Source: .Surgical Swab RIGHT THIGH ABSCESS    Culture Results:   Few Staphylococcus aureus    Organism Identification: Staphylococcus aureus    Organism: Staphylococcus aureus    Method Type: ARANZA      RADIOLOGY: no new studies 42y Female is under our care for right thigh abscess s/p I&D.  Patient states she continues to do well with each passing day.  +Abscess culture for pansensitive staph aureus. Dc planning today on po antibiotics once arrangement for home care is in place.      REVIEW OF SYSTEMS:  [  ] Not able to illicit  General: no fevers no malaise	  Chest:	  GI: no nvd	  Skin: improved right thigh pain	    MEDS:  clindamycin IVPB 900 milliGRAM(s) IV Intermittent every 8 hours    ALLERGIES: penicillin (Rash)  Rocephin (Fever; Short breath)    VITALS:  Vital Signs Last 24 Hrs  T(C): 36.6 (21 Aug 2017 05:08), Max: 37 (20 Aug 2017 14:07)  T(F): 97.9 (21 Aug 2017 05:08), Max: 98.6 (20 Aug 2017 14:07)  HR: 67 (21 Aug 2017 05:08) (62 - 69)  BP: 109/78 (21 Aug 2017 05:08) (109/78 - 134/71)  BP(mean): --  RR: 16 (21 Aug 2017 05:08) (16 - 16)  SpO2: 96% (21 Aug 2017 05:08) (95% - 96%)    PHYSICAL EXAM:  HEENT: n/a  Neck: supple no LN's  Respiratory: lungs clear  Cardiovascular: S1 S2 reg no murmurs  Gastrointestinal: +BS with soft, nontender abdomen; nondistended  Extremities: mild right thigh edema  Skin: would with intacted packing, clean borders.  Surrounding induration and lingering tenderness  Ortho: n/a  Neuro: AAO x 3    LABS/DIAGNOSTIC TESTS:  No new labs    CULTURES:   Culture - Surgical Swab (08.18.17 @ 17:40)    -  Ampicillin/Sulbactam: S <=8/4    -  Clindamycin: S <=0.25    -  Erythromycin: S <=0.25    -  RIF- Rifampin: S <=1    -  Gentamicin: S <=1    -  Ciprofloxacin: S <=1    -  Levofloxacin: S <=0.5    -  Trimethoprim/Sulfamethoxazole: S <=0.5/9.5    -  Vancomycin: S 2    -  Cefazolin: S <=4    -  Moxifloxacin(Aerobic): S <=0.5    -  Oxacillin: S <=0.25    -  Tetra/Doxy: S <=1    Specimen Source: .Surgical Swab RIGHT THIGH ABSCESS    Culture Results:   Few Staphylococcus aureus    Organism Identification: Staphylococcus aureus    Organism: Staphylococcus aureus    Method Type: ARANZA      RADIOLOGY: no new studies

## 2017-08-21 NOTE — DISCHARGE NOTE ADULT - CARE PLAN
Principal Discharge DX:	Cellulitis and abscess  Goal:	to drain and treat with antibiotics.  Instructions for follow-up, activity and diet:	You had an abscess on the back of your right thigh which was drained on 8/18/17. You were then placed on IV antibiotics. Please take levaquin (levofloxacin) 750mg once daily for 7 days. Please follow up with surgeon, Dr. Gurdeep Spring, within 1 week of discharge (information to make an appointment is included in this packet).     Wound care instructions are as follows: Shower, wash area with soap and water, pat dry before dressing changes. Pack daily with iodoform, cover with Allevyn.   Pack daily with iodoform, cover with Allevyn. Recommend shower and wash area with soap and warm water before dressing changes.  Secondary Diagnosis:	DM (diabetes mellitus)  Instructions for follow-up, activity and diet:	Please continue metformin 500mg twice daily. Check fingerstick glucose three times daily before meals and at bedtime. Follow up with PCP between 3-5 days of discharge.  Secondary Diagnosis:	HTN (hypertension)  Instructions for follow-up, activity and diet:	Please continue enalapril 2.5mg once daily. Follow up with PCP within 3-5 days of discharge.  Secondary Diagnosis:	Depression  Instructions for follow-up, activity and diet:	Continue sertraline 100mg daily. Follow up with PCP within 3-5 days of discharge.

## 2017-08-21 NOTE — DISCHARGE NOTE ADULT - PATIENT PORTAL LINK FT
“You can access the [facility] Patient Portal, offered by [facility], by registering with the following website: [email or web address]”

## 2017-08-21 NOTE — DISCHARGE NOTE ADULT - MEDICATION SUMMARY - MEDICATIONS TO TAKE
I will START or STAY ON the medications listed below when I get home from the hospital:    enalapril 2.5 mg oral tablet  -- 1 tab(s) by mouth once a day  -- Indication: For HTN (hypertension)    sertraline 100 mg oral tablet  -- 1 tab(s) by mouth once a day  -- Indication: For Depression    metFORMIN 500 mg oral tablet  -- 1 tab(s) by mouth 2 times a day  -- Indication: For Diabetes    Levaquin 750 mg oral tablet  -- 1 tab(s) by mouth every 24 hours  -- Avoid prolonged or excessive exposure to direct and/or artificial sunlight while taking this medication.  Do not take dairy products, antacids, or iron preparations within one hour of this medication.  Finish all this medication unless otherwise directed by prescriber.  May cause drowsiness or dizziness.  Medication should be taken with plenty of water.    -- Indication: For Cellulitis and abscess

## 2017-08-21 NOTE — PROGRESS NOTE ADULT - ASSESSMENT
1.	Right thigh cellulitis with abscess s/p I&D  ·	dc planning today  ·	continue clindamycin 900mg IV q8h D5  ·	upon dc switch to levaquin 750mg PO daily x 7 days  ·	reconsult prn 1.	Right thigh cellulitis with abscess s/p I&D  ·	dc planning today  ·	continue clindamycin 900mg IV q8h D5, upon dc switch to levaquin 750mg PO daily x 7 days  ·	reconsult prn

## 2017-08-22 LAB
CULTURE RESULTS: SIGNIFICANT CHANGE UP
CULTURE RESULTS: SIGNIFICANT CHANGE UP
SPECIMEN SOURCE: SIGNIFICANT CHANGE UP
SPECIMEN SOURCE: SIGNIFICANT CHANGE UP
SURGICAL PATHOLOGY FINAL REPORT - CH: SIGNIFICANT CHANGE UP

## 2017-08-23 DIAGNOSIS — Z88.0 ALLERGY STATUS TO PENICILLIN: ICD-10-CM

## 2017-08-23 DIAGNOSIS — E11.9 TYPE 2 DIABETES MELLITUS WITHOUT COMPLICATIONS: ICD-10-CM

## 2017-08-23 DIAGNOSIS — Z98.890 OTHER SPECIFIED POSTPROCEDURAL STATES: ICD-10-CM

## 2017-08-23 DIAGNOSIS — I10 ESSENTIAL (PRIMARY) HYPERTENSION: ICD-10-CM

## 2017-08-23 DIAGNOSIS — F32.9 MAJOR DEPRESSIVE DISORDER, SINGLE EPISODE, UNSPECIFIED: ICD-10-CM

## 2017-08-23 DIAGNOSIS — L02.415 CUTANEOUS ABSCESS OF RIGHT LOWER LIMB: ICD-10-CM

## 2017-08-23 DIAGNOSIS — E66.01 MORBID (SEVERE) OBESITY DUE TO EXCESS CALORIES: ICD-10-CM

## 2017-08-23 DIAGNOSIS — B95.61 METHICILLIN SUSCEPTIBLE STAPHYLOCOCCUS AUREUS INFECTION AS THE CAUSE OF DISEASES CLASSIFIED ELSEWHERE: ICD-10-CM

## 2017-08-23 DIAGNOSIS — Z88.8 ALLERGY STATUS TO OTHER DRUGS, MEDICAMENTS AND BIOLOGICAL SUBSTANCES STATUS: ICD-10-CM

## 2017-08-23 DIAGNOSIS — Z82.49 FAMILY HISTORY OF ISCHEMIC HEART DISEASE AND OTHER DISEASES OF THE CIRCULATORY SYSTEM: ICD-10-CM

## 2017-08-23 DIAGNOSIS — Z79.84 LONG TERM (CURRENT) USE OF ORAL HYPOGLYCEMIC DRUGS: ICD-10-CM

## 2017-08-23 DIAGNOSIS — D72.829 ELEVATED WHITE BLOOD CELL COUNT, UNSPECIFIED: ICD-10-CM

## 2017-08-23 DIAGNOSIS — L03.115 CELLULITIS OF RIGHT LOWER LIMB: ICD-10-CM

## 2017-08-23 LAB
CULTURE RESULTS: SIGNIFICANT CHANGE UP
HEV IGM SER QL: SIGNIFICANT CHANGE UP
ORGANISM # SPEC MICROSCOPIC CNT: SIGNIFICANT CHANGE UP
ORGANISM # SPEC MICROSCOPIC CNT: SIGNIFICANT CHANGE UP
SPECIMEN SOURCE: SIGNIFICANT CHANGE UP

## 2018-08-02 NOTE — ED ADULT NURSE NOTE - CAS DISCH CONDITION
PAIN and POSITIVE COPING GROUP    D: Pt Maddie Yaquelin) was one of 3 pts   who participated in today's "Pain and Positive Coping " Group  Topics explored today included: Introductions; Confidentiality; Each group Member's sharing of Information regarding their Level of Pain today,and the Etiology of it;   [place] Sharing and Support; Their observations regarding their Stressors, and the relationship of their Pain to feelings of Anxiety and Depression; Psychoeducation provided by this Therapist; Their sharing of some Positive Coping Activities which they are trying; and the final segment of today's Group was comprised of Calm Deep-Breathing/ Relaxation/ Guided-Imagery/ Mindfulness accompanied by [employer] via CD  [employer] participated actively in Group  Her Level of Pain =  6 today, total body pain, especially across her shoulders,  and some in her neck and back,from Fibromyalgia and from Arthritis  Renato Vazquez has tried various Medications and Physical Therapy, plus Hydrotherapy, in the past, with limited effectiveness  She has much fatigue,and high anxiety  She speaks in a very quiet voice,and she has some thought-blocking,with anxiety, at times  She welcomed the two NEW  Group members today,gave them Validation and Support,and they gave her Support and Validation , also  All Peers said that it helps to talk to a friend or sister, or professional Therapists, in dealing with Chronic Pain  Pt shared that she does some  Housework, and helps her elderly parents with grocery shopping and cooking---this helps her to get her mind off of her pain, temporarily---but sometimes the next day she has MORE physical Pain, because of the exertion---and she has to take a rest for part of that day,to recuperate  Pt stated that she felt more relaxed at the end of the Deep-Breathing/ Guided-Imagery / Mindfulness segment today,and she will keep practicing this at home   She thanked Peers and this Therapist at the end of Group,for a good session  She had no evidence of any SI/HI/AH/VH at Group today  A: Major Depressive Disorder, recurrent, partial remission, F33 41; Generalized Anxiety Disorder,F41 10; Hx of panic; Pt appears to benefit from Group  P: Continue Treatment Plan,Meds, Individual Psychotherapy,Group Therapy,and Positive Coping Skills  Stable

## 2018-11-16 ENCOUNTER — INPATIENT (INPATIENT)
Facility: HOSPITAL | Age: 43
LOS: 3 days | Discharge: HOME | DRG: 603 | End: 2018-11-20
Attending: STUDENT IN AN ORGANIZED HEALTH CARE EDUCATION/TRAINING PROGRAM | Admitting: STUDENT IN AN ORGANIZED HEALTH CARE EDUCATION/TRAINING PROGRAM
Payer: COMMERCIAL

## 2018-11-16 VITALS
DIASTOLIC BLOOD PRESSURE: 70 MMHG | TEMPERATURE: 99 F | OXYGEN SATURATION: 97 % | SYSTOLIC BLOOD PRESSURE: 125 MMHG | HEART RATE: 101 BPM | RESPIRATION RATE: 18 BRPM

## 2018-11-16 DIAGNOSIS — Z29.9 ENCOUNTER FOR PROPHYLACTIC MEASURES, UNSPECIFIED: ICD-10-CM

## 2018-11-16 DIAGNOSIS — E11.9 TYPE 2 DIABETES MELLITUS WITHOUT COMPLICATIONS: ICD-10-CM

## 2018-11-16 DIAGNOSIS — L03.90 CELLULITIS, UNSPECIFIED: ICD-10-CM

## 2018-11-16 DIAGNOSIS — I10 ESSENTIAL (PRIMARY) HYPERTENSION: ICD-10-CM

## 2018-11-16 DIAGNOSIS — Z98.891 HISTORY OF UTERINE SCAR FROM PREVIOUS SURGERY: Chronic | ICD-10-CM

## 2018-11-16 DIAGNOSIS — F32.9 MAJOR DEPRESSIVE DISORDER, SINGLE EPISODE, UNSPECIFIED: ICD-10-CM

## 2018-11-16 DIAGNOSIS — Z87.19 PERSONAL HISTORY OF OTHER DISEASES OF THE DIGESTIVE SYSTEM: Chronic | ICD-10-CM

## 2018-11-16 DIAGNOSIS — L03.312 CELLULITIS OF BACK [ANY PART EXCEPT BUTTOCK]: ICD-10-CM

## 2018-11-16 LAB
ALBUMIN SERPL ELPH-MCNC: 3.2 G/DL — LOW (ref 3.5–5)
ALP SERPL-CCNC: 82 U/L — SIGNIFICANT CHANGE UP (ref 40–120)
ALT FLD-CCNC: 21 U/L DA — SIGNIFICANT CHANGE UP (ref 10–60)
ANION GAP SERPL CALC-SCNC: 13 MMOL/L — SIGNIFICANT CHANGE UP (ref 5–17)
AST SERPL-CCNC: 14 U/L — SIGNIFICANT CHANGE UP (ref 10–40)
BASOPHILS # BLD AUTO: 0.1 K/UL — SIGNIFICANT CHANGE UP (ref 0–0.2)
BASOPHILS NFR BLD AUTO: 0.7 % — SIGNIFICANT CHANGE UP (ref 0–2)
BILIRUB SERPL-MCNC: 0.3 MG/DL — SIGNIFICANT CHANGE UP (ref 0.2–1.2)
BUN SERPL-MCNC: 12 MG/DL — SIGNIFICANT CHANGE UP (ref 7–18)
CALCIUM SERPL-MCNC: 8.7 MG/DL — SIGNIFICANT CHANGE UP (ref 8.4–10.5)
CHLORIDE SERPL-SCNC: 106 MMOL/L — SIGNIFICANT CHANGE UP (ref 96–108)
CO2 SERPL-SCNC: 20 MMOL/L — LOW (ref 22–31)
CREAT SERPL-MCNC: 0.89 MG/DL — SIGNIFICANT CHANGE UP (ref 0.5–1.3)
EOSINOPHIL # BLD AUTO: 0.2 K/UL — SIGNIFICANT CHANGE UP (ref 0–0.5)
EOSINOPHIL NFR BLD AUTO: 1.8 % — SIGNIFICANT CHANGE UP (ref 0–6)
GLUCOSE SERPL-MCNC: 200 MG/DL — HIGH (ref 70–99)
HCG UR QL: NEGATIVE — SIGNIFICANT CHANGE UP
HCT VFR BLD CALC: 36.3 % — SIGNIFICANT CHANGE UP (ref 34.5–45)
HGB BLD-MCNC: 11.4 G/DL — LOW (ref 11.5–15.5)
LACTATE SERPL-SCNC: 1.6 MMOL/L — SIGNIFICANT CHANGE UP (ref 0.7–2)
LACTATE SERPL-SCNC: 2.2 MMOL/L — HIGH (ref 0.7–2)
LYMPHOCYTES # BLD AUTO: 1.7 K/UL — SIGNIFICANT CHANGE UP (ref 1–3.3)
LYMPHOCYTES # BLD AUTO: 15.3 % — SIGNIFICANT CHANGE UP (ref 13–44)
MCHC RBC-ENTMCNC: 24.2 PG — LOW (ref 27–34)
MCHC RBC-ENTMCNC: 31.5 GM/DL — LOW (ref 32–36)
MCV RBC AUTO: 76.6 FL — LOW (ref 80–100)
MONOCYTES # BLD AUTO: 0.6 K/UL — SIGNIFICANT CHANGE UP (ref 0–0.9)
MONOCYTES NFR BLD AUTO: 5.5 % — SIGNIFICANT CHANGE UP (ref 2–14)
NEUTROPHILS # BLD AUTO: 8.6 K/UL — HIGH (ref 1.8–7.4)
NEUTROPHILS NFR BLD AUTO: 76.6 % — SIGNIFICANT CHANGE UP (ref 43–77)
PLATELET # BLD AUTO: 348 K/UL — SIGNIFICANT CHANGE UP (ref 150–400)
POTASSIUM SERPL-MCNC: 4 MMOL/L — SIGNIFICANT CHANGE UP (ref 3.5–5.3)
POTASSIUM SERPL-SCNC: 4 MMOL/L — SIGNIFICANT CHANGE UP (ref 3.5–5.3)
PROT SERPL-MCNC: 7.7 G/DL — SIGNIFICANT CHANGE UP (ref 6–8.3)
RBC # BLD: 4.74 M/UL — SIGNIFICANT CHANGE UP (ref 3.8–5.2)
RBC # FLD: 14.1 % — SIGNIFICANT CHANGE UP (ref 10.3–14.5)
SODIUM SERPL-SCNC: 139 MMOL/L — SIGNIFICANT CHANGE UP (ref 135–145)
WBC # BLD: 11.2 K/UL — HIGH (ref 3.8–10.5)
WBC # FLD AUTO: 11.2 K/UL — HIGH (ref 3.8–10.5)

## 2018-11-16 PROCEDURE — 99223 1ST HOSP IP/OBS HIGH 75: CPT | Mod: GC

## 2018-11-16 PROCEDURE — 99285 EMERGENCY DEPT VISIT HI MDM: CPT

## 2018-11-16 RX ORDER — VANCOMYCIN HCL 1 G
1750 VIAL (EA) INTRAVENOUS EVERY 12 HOURS
Qty: 0 | Refills: 0 | Status: DISCONTINUED | OUTPATIENT
Start: 2018-11-17 | End: 2018-11-18

## 2018-11-16 RX ORDER — SERTRALINE 25 MG/1
50 TABLET, FILM COATED ORAL DAILY
Qty: 0 | Refills: 0 | Status: DISCONTINUED | OUTPATIENT
Start: 2018-11-16 | End: 2018-11-20

## 2018-11-16 RX ORDER — DEXTROSE 50 % IN WATER 50 %
15 SYRINGE (ML) INTRAVENOUS ONCE
Qty: 0 | Refills: 0 | Status: DISCONTINUED | OUTPATIENT
Start: 2018-11-16 | End: 2018-11-20

## 2018-11-16 RX ORDER — DEXTROSE 50 % IN WATER 50 %
12.5 SYRINGE (ML) INTRAVENOUS ONCE
Qty: 0 | Refills: 0 | Status: DISCONTINUED | OUTPATIENT
Start: 2018-11-16 | End: 2018-11-20

## 2018-11-16 RX ORDER — SERTRALINE 25 MG/1
1 TABLET, FILM COATED ORAL
Qty: 0 | Refills: 0 | COMMUNITY

## 2018-11-16 RX ORDER — GLUCAGON INJECTION, SOLUTION 0.5 MG/.1ML
1 INJECTION, SOLUTION SUBCUTANEOUS ONCE
Qty: 0 | Refills: 0 | Status: DISCONTINUED | OUTPATIENT
Start: 2018-11-16 | End: 2018-11-20

## 2018-11-16 RX ORDER — SODIUM CHLORIDE 9 MG/ML
1000 INJECTION, SOLUTION INTRAVENOUS
Qty: 0 | Refills: 0 | Status: DISCONTINUED | OUTPATIENT
Start: 2018-11-16 | End: 2018-11-20

## 2018-11-16 RX ORDER — INSULIN GLARGINE 100 [IU]/ML
10 INJECTION, SOLUTION SUBCUTANEOUS AT BEDTIME
Qty: 0 | Refills: 0 | Status: DISCONTINUED | OUTPATIENT
Start: 2018-11-16 | End: 2018-11-20

## 2018-11-16 RX ORDER — DEXTROSE 50 % IN WATER 50 %
25 SYRINGE (ML) INTRAVENOUS ONCE
Qty: 0 | Refills: 0 | Status: DISCONTINUED | OUTPATIENT
Start: 2018-11-16 | End: 2018-11-20

## 2018-11-16 RX ORDER — VANCOMYCIN HCL 1 G
VIAL (EA) INTRAVENOUS
Qty: 0 | Refills: 0 | Status: DISCONTINUED | OUTPATIENT
Start: 2018-11-16 | End: 2018-11-18

## 2018-11-16 RX ORDER — ENOXAPARIN SODIUM 100 MG/ML
40 INJECTION SUBCUTANEOUS DAILY
Qty: 0 | Refills: 0 | Status: DISCONTINUED | OUTPATIENT
Start: 2018-11-16 | End: 2018-11-20

## 2018-11-16 RX ORDER — SODIUM CHLORIDE 9 MG/ML
1000 INJECTION INTRAMUSCULAR; INTRAVENOUS; SUBCUTANEOUS ONCE
Qty: 0 | Refills: 0 | Status: COMPLETED | OUTPATIENT
Start: 2018-11-16 | End: 2018-11-16

## 2018-11-16 RX ORDER — ACETAMINOPHEN 500 MG
650 TABLET ORAL EVERY 6 HOURS
Qty: 0 | Refills: 0 | Status: DISCONTINUED | OUTPATIENT
Start: 2018-11-16 | End: 2018-11-20

## 2018-11-16 RX ORDER — VANCOMYCIN HCL 1 G
1750 VIAL (EA) INTRAVENOUS ONCE
Qty: 0 | Refills: 0 | Status: COMPLETED | OUTPATIENT
Start: 2018-11-16 | End: 2018-11-16

## 2018-11-16 RX ORDER — INSULIN LISPRO 100/ML
VIAL (ML) SUBCUTANEOUS
Qty: 0 | Refills: 0 | Status: DISCONTINUED | OUTPATIENT
Start: 2018-11-16 | End: 2018-11-20

## 2018-11-16 RX ORDER — METFORMIN HYDROCHLORIDE 850 MG/1
1 TABLET ORAL
Qty: 0 | Refills: 0 | COMMUNITY

## 2018-11-16 RX ADMIN — SODIUM CHLORIDE 1000 MILLILITER(S): 9 INJECTION INTRAMUSCULAR; INTRAVENOUS; SUBCUTANEOUS at 14:36

## 2018-11-16 RX ADMIN — INSULIN GLARGINE 10 UNIT(S): 100 INJECTION, SOLUTION SUBCUTANEOUS at 22:40

## 2018-11-16 RX ADMIN — SODIUM CHLORIDE 1000 MILLILITER(S): 9 INJECTION INTRAMUSCULAR; INTRAVENOUS; SUBCUTANEOUS at 15:40

## 2018-11-16 RX ADMIN — Medication 900 MILLIGRAM(S): at 15:06

## 2018-11-16 RX ADMIN — Medication 650 MILLIGRAM(S): at 21:01

## 2018-11-16 RX ADMIN — SODIUM CHLORIDE 1000 MILLILITER(S): 9 INJECTION INTRAMUSCULAR; INTRAVENOUS; SUBCUTANEOUS at 19:05

## 2018-11-16 RX ADMIN — Medication 250 MILLIGRAM(S): at 19:52

## 2018-11-16 RX ADMIN — SERTRALINE 50 MILLIGRAM(S): 25 TABLET, FILM COATED ORAL at 22:40

## 2018-11-16 RX ADMIN — Medication 100 MILLIGRAM(S): at 14:36

## 2018-11-16 NOTE — H&P ADULT - ATTENDING COMMENTS
Agree with all the above   Patient seen and examined in ED.  was present during examination;   Pt is a 42 y/o F pt with a PMHx of DM (on metformin), HTN, depression, cellulitis  and a significant PSHx of appendicitis,  sent by PMD to the ED for worsening swelling and infection to the upper back. Pt reports that she noticed swelling on her right upper back that started on Saturday that was associated with pain. Pt denies any trauma, insect bite to location before the swelling started. Pt went to see her doctor on Saturday and was started on  bactrim and Levaquin Pt reports that swelling continued to increase with increasing pain and redness. It was associated with fever and with no improvement with antibiotics pt came to ED.  ROS and PE as above     Vital Signs Last 24 Hrs  T(C): 36.8 (2018 18:08), Max: 37.1 (2018 12:52)  T(F): 98.2 (2018 18:08), Max: 98.7 (2018 12:52)  HR: 73 (2018 18:08) (73 - 101)  BP: 124/78 (2018 18:08) (124/78 - 125/70)  BP(mean): --  RR: 18 (2018 18:08) (18 - 18)  SpO2: 95% (2018 18:08) (95% - 97%)    1. Cellulitis of right shoulder, fell outpatient treatment   Subjective fever at home, mild leukocytosis  BC sent   s/p Clindamycin in ED   Started Vancomycin 1750 mg IV twice daily   Tylenol for pain and fever   ID consult requested     2. DM: f/u HBA1C   On metformin at home   Started Lantus 10 un along with humalog per sliding scale     3. Morbidly obese; BMI of 49    4. DVT prophylaxis: Enoxaparin     Plan of care discussed with patient in detail.

## 2018-11-16 NOTE — ED PROVIDER NOTE - PROGRESS NOTE DETAILS
Bedside sonogram preformed which showed no fluid collection under the skin. patient has been taking abx Levaquin and bactrim.

## 2018-11-16 NOTE — H&P ADULT - NEGATIVE CARDIOVASCULAR SYMPTOMS
no orthopnea/no paroxysmal nocturnal dyspnea/no chest pain/no peripheral edema/no dyspnea on exertion/no palpitations

## 2018-11-16 NOTE — H&P ADULT - PROBLEM SELECTOR PLAN 2
F/u HBA1C in am  HSS meanwhile with Lantucs 10 units at bedtime  Pt takes metformin 500mg twice daily at home F/u HBA1C in am  HSS meanwhile with Lantus 10 units at bedtime  Pt takes metformin 500mg twice daily at home

## 2018-11-16 NOTE — H&P ADULT - FAMILY HISTORY
Family history of hypertension in father     Grandparent  Still living? Unknown  Family history of prostate cancer, Age at diagnosis: Age Unknown  Family history of lung cancer, Age at diagnosis: Age Unknown

## 2018-11-16 NOTE — H&P ADULT - PROBLEM SELECTOR PLAN 1
Swelling on upper right back with redness and warm to palpation  Fever for last 2 days  Lactate elevated on admission with elevated wbc count  s/p one dose of clindamycin in ED  Mild fluctuation on exam    Plan:  Vancomycin 1.750 q 12   Tylenol for fever  ID Dr. Lazaro Mosher trough before every 4th dose    PLAN: Swelling on upper right back with redness and warm to palpation  Fever for last 2 days  Lactate elevated on admission with elevated wbc count  s/p one dose of clindamycin in ED  Mild fluctuation on exam    Plan:  Vancomycin 1.750 q 12   Tylenol for fever  ID consult requested  Vanco trough before every 4th dose

## 2018-11-16 NOTE — ED PROVIDER NOTE - PHYSICAL EXAMINATION
Right upper back has an area if induration but not fluctuant, approximately 3 by 5 cm with small punctate lesion in the center but not active bleeding.

## 2018-11-16 NOTE — ED ADULT NURSE NOTE - OBJECTIVE STATEMENT
C/O BOIL TO RIGHT UPPER BACK SINCE SATURDAY , WAS SEEN BY PCP TUESDAY AND WAS STARTED ON ANTIBIOTICS. C/O BOIL TO RIGHT UPPER BACK SINCE SATURDAY , WAS SEEN BY PCP TUESDAY AND WAS STARTED ON ANTIBIOTICS.8:30PM , NOTED TO HAVE REDNESS TO NECK WHILE VANCOMYCIN IS INFUSING WELL VIA PUMP 250ML /HR., VANCOMYCIN IVPB, NOTIFIED , NO SOB NOTED ,NO ITCHINESS , LUNGS CLEAR. AIRWAY PATENT.VANCOMYCIN IVPB  DECREASED TO 125ML/HR, AS PER  C/O BOIL TO RIGHT UPPER BACK SINCE SATURDAY , WAS SEEN BY PCP TUESDAY AND WAS STARTED ON ANTIBIOTICS.8:30PM , NOTED TO HAVE REDNESS TO NECK WHILE VANCOMYCIN IS INFUSING WELL VIA PUMP 250ML /HR., VANCOMYCIN IVPB, NOTIFIED , NO SOB NOTED ,NO ITCHINESS , LUNGS CLEAR. AIRWAY PATENT.VANCOMYCIN IVPB  DECREASED TO 125ML/HR,   AWARE.9PM, REDNESS TO NECK SUBSIDED.NO COMPLAINTS AT PRESENT.

## 2018-11-16 NOTE — ED PROVIDER NOTE - OBJECTIVE STATEMENT
44 y/o F pt with a PMHx of DM (on metformin), HTN, depression and a significant PSHx of appendicitis,  sent by PMD to the ED for worsening swelling and infection to the upper back. Patient reports she is not sure how it started but thinks it is a bug bite. Patient endorsed a fever this morning and took Motrin prior to arrival. Patient notes not much drainage on site. Allergies: Rocephin, Penicillin, Erythromycin

## 2018-11-16 NOTE — ED PROVIDER NOTE - MEDICAL DECISION MAKING DETAILS
Concerned for worsening infection and given now systematic of cellulitis but no drainage or fluid collection noted on sonogram. Will check labs and reassess.

## 2018-11-16 NOTE — H&P ADULT - PROBLEM SELECTOR PLAN 5
IMPROVE VTE Individual Risk Assessment  RISK                                                                Points  [  ] Previous VTE                                                  3  [  ] Thrombophilia                                               2  [  ] Lower limb paralysis                                      2        (unable to hold up >15 seconds)    [  ] Current Cancer                                              2         (within 6 months)  [  ] Immobilization > 24 hrs                                1  [  ] ICU/CCU stay > 24 hours                              1    [  ] Age > 60                                                      1    IMPROVE VTE Score ____1_____    No need of prophylaxis at present IMPROVE VTE Individual Risk Assessment  RISK                                                                Points  [  ] Previous VTE                                                  3  [  ] Thrombophilia                                               2  [  ] Lower limb paralysis                                      2        (unable to hold up >15 seconds)    [  ] Current Cancer                                              2         (within 6 months)  [  ] Immobilization > 24 hrs                                1  [  ] ICU/CCU stay > 24 hours                              1    [  ] Age > 60                                                      1    IMPROVE VTE Score ____1_____    Pt on lovenox for dvt prophylaxis

## 2018-11-16 NOTE — H&P ADULT - HISTORY OF PRESENT ILLNESS
Pt is a 42 y/o F pt with a PMHx of DM (on metformin), HTN, depression, cellulitis  and a significant PSHx of appendicitis,  sent by PMD to the ED for worsening swelling and infection to the upper back. Pt reports that she noticed swelling on her right upper back that started on Saturday that was associated with pain. Pt denies any trauma, insect bite to location before the swelling started. Pt went to see her doctor on Saturday and was started on  bactrim and Levaquin Pt reports that swelling continued to increase with increasing pain and redness. It was associated with fever and with no improvement with antibiotics pt came to ED.  Pt has been diagnosed with diabetes with HBA1C about 6.5 as per patient. Pt had one episode of cellulitis last year of thigh that started after and insect bite and pt was admitted and underwent surgical debridement. In Ed pt was given one dose of clindamycin and fluid bolus with blood cultures sent.

## 2018-11-16 NOTE — ED ADULT NURSE REASSESSMENT NOTE - NS ED NURSE REASSESS COMMENT FT1
RESTING ON A CHAIR.NO COMPLAINTS AT PRESENT. IV NS INFUSING WELL , NO REDNESS, SWELLING NOTED IV SITE

## 2018-11-16 NOTE — H&P ADULT - BACK COMMENTS
Swelling and redness on right upper back about 9x 10 cm in diameter, red and warm to touch,  painful to palpation. Appears tense with mild area of fluctuation in center.

## 2018-11-16 NOTE — ED ADULT NURSE NOTE - NSIMPLEMENTINTERV_GEN_ALL_ED
Implemented All Universal Safety Interventions:  [place] to call system. Call bell, personal items and telephone within reach. Instruct patient to call for assistance. Room bathroom lighting operational. Non-slip footwear when patient is off stretcher. Physically safe environment: no spills, clutter or unnecessary equipment. Stretcher in lowest position, wheels locked, appropriate side rails in place.

## 2018-11-16 NOTE — H&P ADULT - PROBLEM SELECTOR PLAN 3
Bp 125/70 at present  Will monitor and start pt on medication with Bp elevated  Pt home medication is enalapril 10mg once daily

## 2018-11-16 NOTE — H&P ADULT - ASSESSMENT
Pt is 43 yr old female with PMH HTN, DM, Depression, cellulitis in past who came in with swelling and redness of back that started on saturday without any preceding injury, insect bite or rash. Pt was started on oral levaquin and bactrim by her pcp on saturday. Pt swelling continued to progress apt came to ED today for further evaluation

## 2018-11-17 LAB
ANION GAP SERPL CALC-SCNC: 9 MMOL/L — SIGNIFICANT CHANGE UP (ref 5–17)
BASOPHILS # BLD AUTO: 0.1 K/UL — SIGNIFICANT CHANGE UP (ref 0–0.2)
BASOPHILS NFR BLD AUTO: 1.1 % — SIGNIFICANT CHANGE UP (ref 0–2)
BUN SERPL-MCNC: 13 MG/DL — SIGNIFICANT CHANGE UP (ref 7–18)
CALCIUM SERPL-MCNC: 8.3 MG/DL — LOW (ref 8.4–10.5)
CHLORIDE SERPL-SCNC: 111 MMOL/L — HIGH (ref 96–108)
CHOLEST SERPL-MCNC: 124 MG/DL — SIGNIFICANT CHANGE UP (ref 10–199)
CO2 SERPL-SCNC: 21 MMOL/L — LOW (ref 22–31)
CREAT SERPL-MCNC: 0.74 MG/DL — SIGNIFICANT CHANGE UP (ref 0.5–1.3)
EOSINOPHIL # BLD AUTO: 0.3 K/UL — SIGNIFICANT CHANGE UP (ref 0–0.5)
EOSINOPHIL NFR BLD AUTO: 3.9 % — SIGNIFICANT CHANGE UP (ref 0–6)
FOLATE SERPL-MCNC: 14.2 NG/ML — SIGNIFICANT CHANGE UP
GLUCOSE SERPL-MCNC: 110 MG/DL — HIGH (ref 70–99)
HBA1C BLD-MCNC: 7 % — HIGH (ref 4–5.6)
HCT VFR BLD CALC: 32.1 % — LOW (ref 34.5–45)
HDLC SERPL-MCNC: 47 MG/DL — LOW
HGB BLD-MCNC: 10.1 G/DL — LOW (ref 11.5–15.5)
LIPID PNL WITH DIRECT LDL SERPL: 53 MG/DL — SIGNIFICANT CHANGE UP
LYMPHOCYTES # BLD AUTO: 2.2 K/UL — SIGNIFICANT CHANGE UP (ref 1–3.3)
LYMPHOCYTES # BLD AUTO: 27.3 % — SIGNIFICANT CHANGE UP (ref 13–44)
MAGNESIUM SERPL-MCNC: 2.2 MG/DL — SIGNIFICANT CHANGE UP (ref 1.6–2.6)
MCHC RBC-ENTMCNC: 24.2 PG — LOW (ref 27–34)
MCHC RBC-ENTMCNC: 31.3 GM/DL — LOW (ref 32–36)
MCV RBC AUTO: 77.3 FL — LOW (ref 80–100)
MONOCYTES # BLD AUTO: 0.8 K/UL — SIGNIFICANT CHANGE UP (ref 0–0.9)
MONOCYTES NFR BLD AUTO: 10.4 % — SIGNIFICANT CHANGE UP (ref 2–14)
NEUTROPHILS # BLD AUTO: 4.6 K/UL — SIGNIFICANT CHANGE UP (ref 1.8–7.4)
NEUTROPHILS NFR BLD AUTO: 57.3 % — SIGNIFICANT CHANGE UP (ref 43–77)
PHOSPHATE SERPL-MCNC: 3.7 MG/DL — SIGNIFICANT CHANGE UP (ref 2.5–4.5)
PLATELET # BLD AUTO: 297 K/UL — SIGNIFICANT CHANGE UP (ref 150–400)
POTASSIUM SERPL-MCNC: 4 MMOL/L — SIGNIFICANT CHANGE UP (ref 3.5–5.3)
POTASSIUM SERPL-SCNC: 4 MMOL/L — SIGNIFICANT CHANGE UP (ref 3.5–5.3)
RBC # BLD: 4.16 M/UL — SIGNIFICANT CHANGE UP (ref 3.8–5.2)
RBC # FLD: 14.5 % — SIGNIFICANT CHANGE UP (ref 10.3–14.5)
SODIUM SERPL-SCNC: 141 MMOL/L — SIGNIFICANT CHANGE UP (ref 135–145)
TOTAL CHOLESTEROL/HDL RATIO MEASUREMENT: 2.6 RATIO — LOW (ref 3.3–7.1)
TRIGL SERPL-MCNC: 118 MG/DL — SIGNIFICANT CHANGE UP (ref 10–149)
TSH SERPL-MCNC: 3.03 UU/ML — SIGNIFICANT CHANGE UP (ref 0.34–4.82)
VIT B12 SERPL-MCNC: 372 PG/ML — SIGNIFICANT CHANGE UP (ref 232–1245)
WBC # BLD: 8 K/UL — SIGNIFICANT CHANGE UP (ref 3.8–10.5)
WBC # FLD AUTO: 8 K/UL — SIGNIFICANT CHANGE UP (ref 3.8–10.5)

## 2018-11-17 PROCEDURE — 99233 SBSQ HOSP IP/OBS HIGH 50: CPT | Mod: GC

## 2018-11-17 PROCEDURE — 99252 IP/OBS CONSLTJ NEW/EST SF 35: CPT

## 2018-11-17 RX ORDER — ACETAMINOPHEN 500 MG
650 TABLET ORAL ONCE
Qty: 0 | Refills: 0 | Status: COMPLETED | OUTPATIENT
Start: 2018-11-17 | End: 2018-11-17

## 2018-11-17 RX ADMIN — Medication 650 MILLIGRAM(S): at 01:47

## 2018-11-17 RX ADMIN — Medication 650 MILLIGRAM(S): at 01:18

## 2018-11-17 RX ADMIN — Medication 650 MILLIGRAM(S): at 09:12

## 2018-11-17 RX ADMIN — Medication 650 MILLIGRAM(S): at 16:47

## 2018-11-17 RX ADMIN — SERTRALINE 50 MILLIGRAM(S): 25 TABLET, FILM COATED ORAL at 21:50

## 2018-11-17 RX ADMIN — Medication 250 MILLIGRAM(S): at 06:19

## 2018-11-17 RX ADMIN — Medication 650 MILLIGRAM(S): at 02:00

## 2018-11-17 RX ADMIN — Medication 1: at 16:48

## 2018-11-17 RX ADMIN — Medication 250 MILLIGRAM(S): at 18:44

## 2018-11-17 RX ADMIN — Medication 650 MILLIGRAM(S): at 08:52

## 2018-11-17 RX ADMIN — Medication 1: at 08:50

## 2018-11-17 RX ADMIN — INSULIN GLARGINE 10 UNIT(S): 100 INJECTION, SOLUTION SUBCUTANEOUS at 21:50

## 2018-11-17 NOTE — CHART NOTE - NSCHARTNOTEFT_GEN_A_CORE
11/17/18, Pt. c/o heart palpitation, arrythmia. Denied pain and tightness. Stat EKG - showed NSR. HR - 78, BP - 142/68, R - 20, O2sat - 99%RA. Diet changed to DASH/ TLC, consist. carb. (sabino. snacks).At 1am, Pt. c/o headache. Tylenol 650 mg po x 1 stat dose given.

## 2018-11-17 NOTE — CONSULT NOTE ADULT - ATTENDING COMMENTS
pt with right upper back abscess. Would need I & D/ debridement  Got an informed consent last night.   All the options, benefits and risks of the surgery were discussed  Will do under sedation and local anaesthesia

## 2018-11-17 NOTE — PROGRESS NOTE ADULT - SUBJECTIVE AND OBJECTIVE BOX
Patient is a 43y old  Female who presents with a chief complaint of Swelling on back on right side (17 Nov 2018 13:52)      INTERVAL HPI/OVERNIGHT EVENTS: seen and examined at bedside. No significant change since last night. No new complains.      MEDICATIONS  (STANDING):  dextrose 5%. 1000 milliLiter(s) (50 mL/Hr) IV Continuous <Continuous>  dextrose 50% Injectable 12.5 Gram(s) IV Push once  dextrose 50% Injectable 25 Gram(s) IV Push once  dextrose 50% Injectable 25 Gram(s) IV Push once  enoxaparin Injectable 40 milliGRAM(s) SubCutaneous daily  insulin glargine Injectable (LANTUS) 10 Unit(s) SubCutaneous at bedtime  insulin lispro (HumaLOG) corrective regimen sliding scale   SubCutaneous three times a day before meals  sertraline 50 milliGRAM(s) Oral daily  vancomycin  IVPB 1750 milliGRAM(s) IV Intermittent every 12 hours  vancomycin  IVPB        MEDICATIONS  (PRN):  acetaminophen   Tablet .. 650 milliGRAM(s) Oral every 6 hours PRN Temp greater or equal to 38C (100.4F), Mild Pain (1 - 3)  dextrose 40% Gel 15 Gram(s) Oral once PRN Blood Glucose LESS THAN 70 milliGRAM(s)/deciLiter  glucagon  Injectable 1 milliGRAM(s) IntraMuscular once PRN Glucose <70 milliGRAM(s)/deciLiter      Allergies    erythromycin (Unknown)  penicillin (Rash)  Rocephin (Fever; Short breath)    Intolerances        REVIEW OF SYSTEMS:  CONSTITUTIONAL: No fever, weight loss, or fatigue  RESPIRATORY: No cough, wheezing, chills or hemoptysis; No shortness of breath  CARDIOVASCULAR: No chest pain, palpitations, dizziness, or leg swelling  GASTROINTESTINAL: No abdominal or epigastric pain. No nausea, vomiting, or hematemesis; No diarrhea or constipation. No melena or hematochezia.  NEUROLOGICAL: No headaches, memory loss, loss of strength, numbness, or tremors  MUSCULOSKELETAL: No joint pain or swelling; No muscle, back, or extremity pain      Vital Signs Last 24 Hrs  T(C): 37.2 (17 Nov 2018 08:23), Max: 37.2 (17 Nov 2018 08:23)  T(F): 99 (17 Nov 2018 08:23), Max: 99 (17 Nov 2018 08:23)  HR: 77 (17 Nov 2018 08:23) (73 - 78)  BP: 127/68 (17 Nov 2018 08:23) (111/57 - 142/68)  BP(mean): --  RR: 18 (17 Nov 2018 08:23) (18 - 20)  SpO2: 98% (17 Nov 2018 08:23) (95% - 100%)    PHYSICAL EXAM:  GENERAL: NAD, well-groomed, well-developed, obese   HEAD:  Atraumatic, Normocephalic  EYES: conjunctiva and sclera clear  NECK: Supple, No JVD, Normal thyroid  NERVOUS SYSTEM:  Alert & Oriented X3, No gross focal deficits  CHEST/LUNG: Clear to percussion bilaterally; No rales, rhonchi, wheezing, or rubs  HEART: Regular rate and rhythm; No murmurs, rubs, or gallops  ABDOMEN: Soft, Nontender, Nondistended; Bowel sounds present  EXTREMITIES:  No clubbing, cyanosis, or edema  SKIN: large 2 x2 red, tender with some fluctuation spot on the right shoulder.     LABS:                        10.1   8.0   )-----------( 297      ( 17 Nov 2018 06:48 )             32.1     11-17    141  |  111<H>  |  13  ----------------------------<  110<H>  4.0   |  21<L>  |  0.74    Ca    8.3<L>      17 Nov 2018 06:48  Phos  3.7     11-17  Mg     2.2     11-17    TPro  7.7  /  Alb  3.2<L>  /  TBili  0.3  /  DBili  x   /  AST  14  /  ALT  21  /  AlkPhos  82  11-16        CAPILLARY BLOOD GLUCOSE  108 (16 Nov 2018 18:08)      POCT Blood Glucose.: 108 mg/dL (17 Nov 2018 12:05)  POCT Blood Glucose.: 158 mg/dL (17 Nov 2018 08:34)  POCT Blood Glucose.: 154 mg/dL (16 Nov 2018 22:14)      RADIOLOGY & ADDITIONAL TESTS:    Imaging Personally Reviewed:  [ ] YES  [ ] NO    Consultant(s) Notes Reviewed:  [ ] YES  [ ] NO    Care Discussed with Consultants/Other Providers [ ] YES  [ ] NO    Plan of Care discussed with Housestaff [ ]YES [ ] NO

## 2018-11-17 NOTE — PROGRESS NOTE ADULT - PROBLEM SELECTOR PLAN 1
Cellulitis and possible abscess  Afebrile, no leukocytosis   On Vancomycin 1750 mg IV twice daily for now   Called surgery for possible I&D

## 2018-11-17 NOTE — CONSULT NOTE ADULT - SUBJECTIVE AND OBJECTIVE BOX
44 y/o morbidly obese woman with PMH of DM, HTN, depression, recurrent cellulitis/abscess, c/o pain, swelling in right upper back for last few days. Pt denies any injury/trauma to the affected site; seen by PMD and started Bactrim and Levaquin. However, patient states the swelling getting worse with increasing pain and redness, prompting ED visit.  Pt denies fever/chills, CP, SOB, dizziness, dysuria.  Pt had I & D for right leg abscess in Aug, 2017      PAST MEDICAL & SURGICAL HISTORY:  Depression  HTN (hypertension)  DM (diabetes mellitus)  H/O:   H/O appendicitis  H/O I & D right leg abscess 2017    MEDICATIONS  (STANDING):  dextrose 5%. 1000 milliLiter(s) (50 mL/Hr) IV Continuous <Continuous>  dextrose 50% Injectable 12.5 Gram(s) IV Push once  dextrose 50% Injectable 25 Gram(s) IV Push once  dextrose 50% Injectable 25 Gram(s) IV Push once  enoxaparin Injectable 40 milliGRAM(s) SubCutaneous daily  insulin glargine Injectable (LANTUS) 10 Unit(s) SubCutaneous at bedtime  insulin lispro (HumaLOG) corrective regimen sliding scale   SubCutaneous three times a day before meals  sertraline 50 milliGRAM(s) Oral daily  vancomycin  IVPB 1750 milliGRAM(s) IV Intermittent every 12 hours  vancomycin  IVPB        MEDICATIONS  (PRN):  acetaminophen   Tablet .. 650 milliGRAM(s) Oral every 6 hours PRN Temp greater or equal to 38C (100.4F), Mild Pain (1 - 3)  dextrose 40% Gel 15 Gram(s) Oral once PRN Blood Glucose LESS THAN 70 milliGRAM(s)/deciLiter  glucagon  Injectable 1 milliGRAM(s) IntraMuscular once PRN Glucose <70 milliGRAM(s)/deciLiter      PE: morbidly obese, comfortable    Vital Signs Last 24 Hrs  T(C): 36.5 (2018 15:49), Max: 37.2 (2018 08:23)  T(F): 97.7 (2018 15:49), Max: 99 (2018 08:23)  HR: 82 (2018 15:49) (76 - 82)  BP: 130/78 (2018 15:49) (111/57 - 142/68)  BP(mean): --  RR: 18 (2018 15:49) (18 - 20)  SpO2: 96% (2018 15:49) (96% - 100%)    Back: right upper back indurated area, 7 cm x  5 cm with erythema and tenderness and flocculence, no active discharge                            10.1   8.0   )-----------( 297      ( 2018 06:48 )             32.1         141  |  111<H>  |  13  ----------------------------<  110<H>  4.0   |  21<L>  |  0.74    Ca    8.3<L>      2018 06:48  Phos  3.7       Mg     2.2         TPro  7.7  /  Alb  3.2<L>  /  TBili  0.3  /  DBili  x   /  AST  14  /  ALT  21  /  AlkPhos  82  11-16

## 2018-11-17 NOTE — CONSULT NOTE ADULT - SUBJECTIVE AND OBJECTIVE BOX
HPI:  Pt is a 44 y/o F pt with a PMHx of DM (on metformin), HTN, depression, cellulitis  and a significant PSHx of appendicitis,  sent by PMD to the ED for worsening swelling and infection to the upper back. Pt reports that she noticed swelling on her right upper back that started on Saturday that was associated with pain. Pt denies any trauma, insect bite to location before the swelling started. Pt went to see her doctor on Saturday and was started on  bactrim and Levaquin Pt reports that swelling continued to increase with increasing pain and redness. It was associated with fever and with no improvement with antibiotics pt came to ED.  Pt has been diagnosed with diabetes with HBA1C about 6.5 as per patient. Pt had one episode of cellulitis last year of thigh that started after and insect bite and pt was admitted and underwent surgical debridement. In Ed pt was given one dose of clindamycin and fluid bolus with blood cultures sent. (2018 17:44)      PAST MEDICAL & SURGICAL HISTORY:  Depression  HTN (hypertension)  DM (diabetes mellitus)  H/O:   H/O appendicitis      erythromycin (Unknown)  penicillin (Rash)  Rocephin (Fever; Short breath)      Meds:  acetaminophen   Tablet .. 650 milliGRAM(s) Oral every 6 hours PRN  dextrose 40% Gel 15 Gram(s) Oral once PRN  dextrose 5%. 1000 milliLiter(s) IV Continuous <Continuous>  dextrose 50% Injectable 12.5 Gram(s) IV Push once  dextrose 50% Injectable 25 Gram(s) IV Push once  dextrose 50% Injectable 25 Gram(s) IV Push once  enoxaparin Injectable 40 milliGRAM(s) SubCutaneous daily  glucagon  Injectable 1 milliGRAM(s) IntraMuscular once PRN  insulin glargine Injectable (LANTUS) 10 Unit(s) SubCutaneous at bedtime  insulin lispro (HumaLOG) corrective regimen sliding scale   SubCutaneous three times a day before meals  sertraline 50 milliGRAM(s) Oral daily  vancomycin  IVPB 1750 milliGRAM(s) IV Intermittent every 12 hours  vancomycin  IVPB          SOCIAL HISTORY:  Smoker:  YES / NO        PACK YEARS:                         WHEN QUIT?  ETOH use:  YES / NO               FREQUENCY / QUANTITY:  Ilicit Drug use:  YES / NO  Occupation:  Assisted device use (Cane / Walker):  Live with:    FAMILY HISTORY:  Family history of lung cancer (Grandparent)  Family history of prostate cancer (Grandparent)  Family history of hypertension in father      VITALS:  Vital Signs Last 24 Hrs  T(C): 37.2 (2018 08:23), Max: 37.2 (2018 08:23)  T(F): 99 (2018 08:23), Max: 99 (2018 08:23)  HR: 77 (2018 08:23) (73 - 78)  BP: 127/68 (2018 08:23) (111/57 - 142/68)  BP(mean): --  RR: 18 (2018 08:23) (18 - 20)  SpO2: 98% (2018 08:23) (95% - 100%)    LABS/DIAGNOSTIC TESTS:                          10.1   8.0   )-----------( 297      ( 2018 06:48 )             32.1     WBC Count: 8.0 K/uL ( @ 06:48)  WBC Count: 11.2 K/uL ( @ 14:51)          141  |  111<H>  |  13  ----------------------------<  110<H>  4.0   |  21<L>  |  0.74    Ca    8.3<L>      2018 06:48  Phos  3.7       Mg     2.2         TPro  7.7  /  Alb  3.2<L>  /  TBili  0.3  /  DBili  x   /  AST  14  /  ALT  21  /  AlkPhos  82            LIVER FUNCTIONS - ( 2018 14:30 )  Alb: 3.2 g/dL / Pro: 7.7 g/dL / ALK PHOS: 82 U/L / ALT: 21 U/L DA / AST: 14 U/L / GGT: x                 LACTATE:Lactate, Blood: 1.6 mmol/L ( @ 19:04)  Lactate, Blood: 2.2 mmol/L ( @ 15:04)      ABG -     CULTURES:       RADIOLOGY:       ROS  [  ] UNABLE TO ELICIT HPI:  Pt is a 42 y/o F pt with a PMHx of DM (on metformin), HTN, depression, cellulitis  and a significant PSHx of appendicitis,  sent by PMD to the ED for worsening swelling and infection to the upper back. Pt reports that she noticed swelling on her right upper back that started on Saturday that was associated with pain. Pt denies any trauma, insect bite to location before the swelling started. Pt went to see her doctor on Saturday and was started on  bactrim and Levaquin Pt reports that swelling continued to increase with increasing pain and redness. It was associated with fever and with no improvement with antibiotics pt came to ED.  Pt has been diagnosed with diabetes with HBA1C about 6.5 as per patient. Pt had one episode of cellulitis last year of thigh that started after and insect bite and pt was admitted and underwent surgical debridement.     She started having symptoms about 1 week ago and getting worse despite taking po antibiotics. she c/o severe pain over right upper back region, she had fevers and chills at home aswell. No other symptoms.      PAST MEDICAL & SURGICAL HISTORY:  Depression  HTN (hypertension)  DM (diabetes mellitus)  H/O:   H/O appendicitis      erythromycin (Unknown)  penicillin (Rash)  Rocephin (Fever; Short breath)      Meds:  acetaminophen   Tablet .. 650 milliGRAM(s) Oral every 6 hours PRN  dextrose 40% Gel 15 Gram(s) Oral once PRN  dextrose 5%. 1000 milliLiter(s) IV Continuous <Continuous>  dextrose 50% Injectable 12.5 Gram(s) IV Push once  dextrose 50% Injectable 25 Gram(s) IV Push once  dextrose 50% Injectable 25 Gram(s) IV Push once  enoxaparin Injectable 40 milliGRAM(s) SubCutaneous daily  glucagon  Injectable 1 milliGRAM(s) IntraMuscular once PRN  insulin glargine Injectable (LANTUS) 10 Unit(s) SubCutaneous at bedtime  insulin lispro (HumaLOG) corrective regimen sliding scale   SubCutaneous three times a day before meals  sertraline 50 milliGRAM(s) Oral daily  vancomycin  IVPB 1750 milliGRAM(s) IV Intermittent every 12 hours  vancomycin  IVPB          SOCIAL HISTORY:  Smoker:  no  ETOH use:  no  Ilicit Drug use: no    FAMILY HISTORY:  Family history of lung cancer (Grandparent)  Family history of prostate cancer (Grandparent)  Family history of hypertension in father      VITALS:  Vital Signs Last 24 Hrs  T(C): 37.2 (2018 08:23), Max: 37.2 (2018 08:23)  T(F): 99 (2018 08:23), Max: 99 (2018 08:23)  HR: 77 (2018 08:23) (73 - 78)  BP: 127/68 (2018 08:23) (111/57 - 142/68)  BP(mean): --  RR: 18 (2018 08:23) (18 - 20)  SpO2: 98% (2018 08:23) (95% - 100%)    LABS/DIAGNOSTIC TESTS:                          10.1   8.0   )-----------( 297      ( 2018 06:48 )             32.1     WBC Count: 8.0 K/uL ( @ 06:48)  WBC Count: 11.2 K/uL ( @ 14:51)          141  |  111<H>  |  13  ----------------------------<  110<H>  4.0   |  21<L>  |  0.74    Ca    8.3<L>      2018 06:48  Phos  3.7       Mg     2.2         TPro  7.7  /  Alb  3.2<L>  /  TBili  0.3  /  DBili  x   /  AST  14  /  ALT  21  /  AlkPhos  82            LIVER FUNCTIONS - ( 2018 14:30 )  Alb: 3.2 g/dL / Pro: 7.7 g/dL / ALK PHOS: 82 U/L / ALT: 21 U/L DA / AST: 14 U/L / GGT: x                 LACTATE:Lactate, Blood: 1.6 mmol/L ( @ 19:04)  Lactate, Blood: 2.2 mmol/L ( @ 15:04)      ABG -     CULTURES:       RADIOLOGY:       ROS  [  ] UNABLE TO ELICIT

## 2018-11-17 NOTE — CONSULT NOTE ADULT - ASSESSMENT
42 y/o woman with right upper back abscess, likely infected sebaceous cysts    -Cont abx  -Will schedule for I & D  -D/W Dr Mistry
Right back abscess  fevers  Leukocytosis - normalized    plan - cont Vancomycin 1750mgs iv q12 hrs  surgical eval for I&D

## 2018-11-17 NOTE — CONSULT NOTE ADULT - RS GEN PE MLT RESP DETAILS PC
breath sounds equal/good air movement/no rales/no wheezes/no rhonchi/clear to auscultation bilaterally

## 2018-11-17 NOTE — CONSULT NOTE ADULT - SKIN COMMENTS
right upper back area of erythema, warmth and tenderness with a central area of fluctuance, no drainage at this time

## 2018-11-18 LAB
ANION GAP SERPL CALC-SCNC: 7 MMOL/L — SIGNIFICANT CHANGE UP (ref 5–17)
BUN SERPL-MCNC: 11 MG/DL — SIGNIFICANT CHANGE UP (ref 7–18)
CALCIUM SERPL-MCNC: 8.7 MG/DL — SIGNIFICANT CHANGE UP (ref 8.4–10.5)
CHLORIDE SERPL-SCNC: 110 MMOL/L — HIGH (ref 96–108)
CO2 SERPL-SCNC: 25 MMOL/L — SIGNIFICANT CHANGE UP (ref 22–31)
CREAT SERPL-MCNC: 0.79 MG/DL — SIGNIFICANT CHANGE UP (ref 0.5–1.3)
GLUCOSE SERPL-MCNC: 118 MG/DL — HIGH (ref 70–99)
HCT VFR BLD CALC: 32.1 % — LOW (ref 34.5–45)
HGB BLD-MCNC: 9.9 G/DL — LOW (ref 11.5–15.5)
INR BLD: 0.99 RATIO — SIGNIFICANT CHANGE UP (ref 0.88–1.16)
MCHC RBC-ENTMCNC: 23.9 PG — LOW (ref 27–34)
MCHC RBC-ENTMCNC: 30.9 GM/DL — LOW (ref 32–36)
MCV RBC AUTO: 77.3 FL — LOW (ref 80–100)
PLATELET # BLD AUTO: 327 K/UL — SIGNIFICANT CHANGE UP (ref 150–400)
POTASSIUM SERPL-MCNC: 4.3 MMOL/L — SIGNIFICANT CHANGE UP (ref 3.5–5.3)
POTASSIUM SERPL-SCNC: 4.3 MMOL/L — SIGNIFICANT CHANGE UP (ref 3.5–5.3)
PROTHROM AB SERPL-ACNC: 11 SEC — SIGNIFICANT CHANGE UP (ref 10–12.9)
RBC # BLD: 4.15 M/UL — SIGNIFICANT CHANGE UP (ref 3.8–5.2)
RBC # FLD: 14.5 % — SIGNIFICANT CHANGE UP (ref 10.3–14.5)
SODIUM SERPL-SCNC: 142 MMOL/L — SIGNIFICANT CHANGE UP (ref 135–145)
VANCOMYCIN TROUGH SERPL-MCNC: 11.2 UG/ML — SIGNIFICANT CHANGE UP (ref 10–20)
WBC # BLD: 7 K/UL — SIGNIFICANT CHANGE UP (ref 3.8–10.5)
WBC # FLD AUTO: 7 K/UL — SIGNIFICANT CHANGE UP (ref 3.8–10.5)

## 2018-11-18 PROCEDURE — 10061 I&D ABSCESS COMP/MULTIPLE: CPT

## 2018-11-18 PROCEDURE — 99233 SBSQ HOSP IP/OBS HIGH 50: CPT | Mod: GC

## 2018-11-18 RX ORDER — VANCOMYCIN HCL 1 G
2000 VIAL (EA) INTRAVENOUS EVERY 12 HOURS
Qty: 0 | Refills: 0 | Status: DISCONTINUED | OUTPATIENT
Start: 2018-11-18 | End: 2018-11-20

## 2018-11-18 RX ORDER — OXYCODONE AND ACETAMINOPHEN 5; 325 MG/1; MG/1
1 TABLET ORAL EVERY 4 HOURS
Qty: 0 | Refills: 0 | Status: DISCONTINUED | OUTPATIENT
Start: 2018-11-18 | End: 2018-11-20

## 2018-11-18 RX ORDER — SODIUM CHLORIDE 9 MG/ML
1000 INJECTION, SOLUTION INTRAVENOUS
Qty: 0 | Refills: 0 | Status: DISCONTINUED | OUTPATIENT
Start: 2018-11-18 | End: 2018-11-18

## 2018-11-18 RX ORDER — ACETAMINOPHEN 500 MG
1000 TABLET ORAL ONCE
Qty: 0 | Refills: 0 | Status: DISCONTINUED | OUTPATIENT
Start: 2018-11-18 | End: 2018-11-18

## 2018-11-18 RX ORDER — HYDROMORPHONE HYDROCHLORIDE 2 MG/ML
0.5 INJECTION INTRAMUSCULAR; INTRAVENOUS; SUBCUTANEOUS
Qty: 0 | Refills: 0 | Status: DISCONTINUED | OUTPATIENT
Start: 2018-11-18 | End: 2018-11-18

## 2018-11-18 RX ORDER — ONDANSETRON 8 MG/1
4 TABLET, FILM COATED ORAL ONCE
Qty: 0 | Refills: 0 | Status: DISCONTINUED | OUTPATIENT
Start: 2018-11-18 | End: 2018-11-18

## 2018-11-18 RX ADMIN — Medication 250 MILLIGRAM(S): at 21:32

## 2018-11-18 RX ADMIN — Medication 650 MILLIGRAM(S): at 23:49

## 2018-11-18 RX ADMIN — Medication 250 MILLIGRAM(S): at 07:37

## 2018-11-18 RX ADMIN — OXYCODONE AND ACETAMINOPHEN 1 TABLET(S): 5; 325 TABLET ORAL at 12:06

## 2018-11-18 RX ADMIN — OXYCODONE AND ACETAMINOPHEN 1 TABLET(S): 5; 325 TABLET ORAL at 21:35

## 2018-11-18 RX ADMIN — OXYCODONE AND ACETAMINOPHEN 1 TABLET(S): 5; 325 TABLET ORAL at 11:36

## 2018-11-18 RX ADMIN — INSULIN GLARGINE 10 UNIT(S): 100 INJECTION, SOLUTION SUBCUTANEOUS at 21:33

## 2018-11-18 RX ADMIN — SERTRALINE 50 MILLIGRAM(S): 25 TABLET, FILM COATED ORAL at 21:32

## 2018-11-18 RX ADMIN — OXYCODONE AND ACETAMINOPHEN 1 TABLET(S): 5; 325 TABLET ORAL at 23:49

## 2018-11-18 NOTE — PROGRESS NOTE ADULT - SUBJECTIVE AND OBJECTIVE BOX
Post Op    Pt feeling well, minimal wound pain    PE: comfortable, NAD    Vital Signs Last 24 Hrs  T(C): 36.9 (18 Nov 2018 18:07), Max: 36.9 (18 Nov 2018 07:39)  T(F): 98.4 (18 Nov 2018 18:07), Max: 98.4 (18 Nov 2018 07:39)  HR: 85 (18 Nov 2018 18:07) (59 - 85)  BP: 149/72 (18 Nov 2018 18:07) (106/83 - 149/72)  BP(mean): --  RR: 20 (18 Nov 2018 18:07) (16 - 22)  SpO2: 97% (18 Nov 2018 18:07) (95% - 98%)    Back: right upper back wound dressing C/D/I, no bleeding or hematoma    MEDICATIONS  (STANDING):  dextrose 5%. 1000 milliLiter(s) (50 mL/Hr) IV Continuous <Continuous>  dextrose 50% Injectable 12.5 Gram(s) IV Push once  dextrose 50% Injectable 25 Gram(s) IV Push once  dextrose 50% Injectable 25 Gram(s) IV Push once  enoxaparin Injectable 40 milliGRAM(s) SubCutaneous daily  insulin glargine Injectable (LANTUS) 10 Unit(s) SubCutaneous at bedtime  insulin lispro (HumaLOG) corrective regimen sliding scale   SubCutaneous three times a day before meals  sertraline 50 milliGRAM(s) Oral daily  vancomycin  IVPB 2000 milliGRAM(s) IV Intermittent every 12 hours    MEDICATIONS  (PRN):  acetaminophen   Tablet .. 650 milliGRAM(s) Oral every 6 hours PRN Temp greater or equal to 38C (100.4F), Mild Pain (1 - 3)  dextrose 40% Gel 15 Gram(s) Oral once PRN Blood Glucose LESS THAN 70 milliGRAM(s)/deciLiter  glucagon  Injectable 1 milliGRAM(s) IntraMuscular once PRN Glucose <70 milliGRAM(s)/deciLiter  oxyCODONE    5 mG/acetaminophen 325 mG 1 Tablet(s) Oral every 4 hours PRN Mild Pain (1 - 3)

## 2018-11-18 NOTE — PROGRESS NOTE ADULT - SUBJECTIVE AND OBJECTIVE BOX
PGY 1 Note discussed with supervising resident and primary attending    Patient is a 43y old  Female who presents with a chief complaint of Swelling on back on right side (17 Nov 2018 18:26)      INTERVAL HPI/OVERNIGHT EVENTS: offers no new complaints    MEDICATIONS  (STANDING):  acetaminophen  IVPB .. 1000 milliGRAM(s) IV Intermittent once  dextrose 5%. 1000 milliLiter(s) (50 mL/Hr) IV Continuous <Continuous>  dextrose 50% Injectable 12.5 Gram(s) IV Push once  dextrose 50% Injectable 25 Gram(s) IV Push once  dextrose 50% Injectable 25 Gram(s) IV Push once  enoxaparin Injectable 40 milliGRAM(s) SubCutaneous daily  insulin glargine Injectable (LANTUS) 10 Unit(s) SubCutaneous at bedtime  insulin lispro (HumaLOG) corrective regimen sliding scale   SubCutaneous three times a day before meals  lactated ringers. 1000 milliLiter(s) (100 mL/Hr) IV Continuous <Continuous>  sertraline 50 milliGRAM(s) Oral daily  vancomycin  IVPB 1750 milliGRAM(s) IV Intermittent every 12 hours  vancomycin  IVPB        MEDICATIONS  (PRN):  acetaminophen   Tablet .. 650 milliGRAM(s) Oral every 6 hours PRN Temp greater or equal to 38C (100.4F), Mild Pain (1 - 3)  dextrose 40% Gel 15 Gram(s) Oral once PRN Blood Glucose LESS THAN 70 milliGRAM(s)/deciLiter  glucagon  Injectable 1 milliGRAM(s) IntraMuscular once PRN Glucose <70 milliGRAM(s)/deciLiter  HYDROmorphone  Injectable 0.5 milliGRAM(s) IV Push every 10 minutes PRN Moderate Pain (4 - 6)  ondansetron Injectable 4 milliGRAM(s) IV Push once PRN Nausea and/or Vomiting      __________________________________________________  REVIEW OF SYSTEMS:    CONSTITUTIONAL: No fever,   EYES: no acute visual disturbances  NECK: No pain or stiffness  RESPIRATORY: No cough; No shortness of breath  CARDIOVASCULAR: No chest pain, no palpitations  GASTROINTESTINAL: No pain. No nausea or vomiting; No diarrhea   NEUROLOGICAL: No headache or numbness, no tremors  MUSCULOSKELETAL: No joint pain, no muscle pain  GENITOURINARY: no dysuria, no frequency, no hesitancy  PSYCHIATRY: no depression , no anxiety  ALL OTHER  ROS negative        Vital Signs Last 24 Hrs  T(C): 36.9 (18 Nov 2018 08:37), Max: 36.9 (18 Nov 2018 07:39)  T(F): 98.4 (18 Nov 2018 08:37), Max: 98.4 (18 Nov 2018 07:39)  HR: 72 (18 Nov 2018 08:37) (72 - 82)  BP: 116/60 (18 Nov 2018 08:37) (116/60 - 130/78)  BP(mean): --  RR: 16 (18 Nov 2018 08:37) (16 - 18)  SpO2: 97% (18 Nov 2018 08:37) (95% - 97%)    ________________________________________________  PHYSICAL EXAM:  GENERAL: NAD  HEENT: Normocephalic;  conjunctivae and sclerae clear; moist mucous membranes;   NECK : supple  CHEST/LUNG: Clear to auscultation bilaterally with good air entry   HEART: S1 S2  regular; no murmurs, gallops or rubs  ABDOMEN: Soft, Nontender, Nondistended; Bowel sounds present  EXTREMITIES: no cyanosis; no edema; no calf tenderness  SKIN: warm and dry; no rash  NERVOUS SYSTEM:  Awake and alert; Oriented  to place, person and time ; no new deficits    _________________________________________________  LABS:                        9.9    7.0   )-----------( 327      ( 18 Nov 2018 05:57 )             32.1     11-18    142  |  110<H>  |  11  ----------------------------<  118<H>  4.3   |  25  |  0.79    Ca    8.7      18 Nov 2018 05:57  Phos  3.7     11-17  Mg     2.2     11-17    TPro  7.7  /  Alb  3.2<L>  /  TBili  0.3  /  DBili  x   /  AST  14  /  ALT  21  /  AlkPhos  82  11-16    PT/INR - ( 18 Nov 2018 05:57 )   PT: 11.0 sec;   INR: 0.99 ratio             CAPILLARY BLOOD GLUCOSE      POCT Blood Glucose.: 126 mg/dL (18 Nov 2018 09:59)  POCT Blood Glucose.: 137 mg/dL (18 Nov 2018 08:05)  POCT Blood Glucose.: 196 mg/dL (17 Nov 2018 21:30)  POCT Blood Glucose.: 154 mg/dL (17 Nov 2018 16:27)  POCT Blood Glucose.: 108 mg/dL (17 Nov 2018 12:05)        RADIOLOGY & ADDITIONAL TESTS:    Imaging Personally Reviewed:  YES/NO    Consultant(s) Notes Reviewed:   YES/ No    Care Discussed with Consultants :     Plan of care was discussed with patient and /or primary care giver; all questions and concerns were addressed and care was aligned with patient's wishes.

## 2018-11-18 NOTE — PROGRESS NOTE ADULT - PROBLEM SELECTOR PLAN 1
Cellulitis and possible abscess  Afebrile, no leukocytosis   On Vancomycin 1750 mg IV twice daily for now   Pt went to OR for I&D  Pt Vanco trough 11 Cellulitis and possible abscess  Afebrile, no leukocytosis   On Vancomycin 1750 mg IV twice daily for now   Pt went to OR for I&D  Pt Vanco trough 11  Blood cultures negative

## 2018-11-18 NOTE — PROGRESS NOTE ADULT - SUBJECTIVE AND OBJECTIVE BOX
43y Female    Meds:  vancomycin  IVPB 2000 milliGRAM(s) IV Intermittent every 12 hours    Allergies    erythromycin (Unknown)  penicillin (Rash)  Rocephin (Fever; Short breath)    Intolerances        VITALS:  Vital Signs Last 24 Hrs  T(C): 36.7 (18 Nov 2018 11:16), Max: 36.9 (18 Nov 2018 07:39)  T(F): 98 (18 Nov 2018 11:16), Max: 98.4 (18 Nov 2018 07:39)  HR: 59 (18 Nov 2018 11:16) (59 - 82)  BP: 113/54 (18 Nov 2018 11:16) (106/83 - 130/78)  BP(mean): --  RR: 20 (18 Nov 2018 11:16) (16 - 22)  SpO2: 97% (18 Nov 2018 11:16) (95% - 98%)    LABS/DIAGNOSTIC TESTS:                          9.9    7.0   )-----------( 327      ( 18 Nov 2018 05:57 )             32.1         11-18    142  |  110<H>  |  11  ----------------------------<  118<H>  4.3   |  25  |  0.79    Ca    8.7      18 Nov 2018 05:57  Phos  3.7     11-17  Mg     2.2     11-17    TPro  7.7  /  Alb  3.2<L>  /  TBili  0.3  /  DBili  x   /  AST  14  /  ALT  21  /  AlkPhos  82  11-16      LIVER FUNCTIONS - ( 16 Nov 2018 14:30 )  Alb: 3.2 g/dL / Pro: 7.7 g/dL / ALK PHOS: 82 U/L / ALT: 21 U/L DA / AST: 14 U/L / GGT: x             CULTURES: .Blood Blood-Peripheral  11-16 @ 18:03   No growth to date.  --  --            RADIOLOGY:      ROS:  [  ] UNABLE TO ELICIT 43y Female who is s/p I&D of her back abscess in the OR today, she has a lot of pain at the site of the abscess but is otherwise asymptomatic, she has no nausea, vomiting or diarrhea, no fevers or chills.    Meds:  vancomycin  IVPB 2000 milliGRAM(s) IV Intermittent every 12 hours    Allergies    erythromycin (Unknown)  penicillin (Rash)  Rocephin (Fever; Short breath)    Intolerances        VITALS:  Vital Signs Last 24 Hrs  T(C): 36.7 (18 Nov 2018 11:16), Max: 36.9 (18 Nov 2018 07:39)  T(F): 98 (18 Nov 2018 11:16), Max: 98.4 (18 Nov 2018 07:39)  HR: 59 (18 Nov 2018 11:16) (59 - 82)  BP: 113/54 (18 Nov 2018 11:16) (106/83 - 130/78)  BP(mean): --  RR: 20 (18 Nov 2018 11:16) (16 - 22)  SpO2: 97% (18 Nov 2018 11:16) (95% - 98%)    LABS/DIAGNOSTIC TESTS:                          9.9    7.0   )-----------( 327      ( 18 Nov 2018 05:57 )             32.1         11-18    142  |  110<H>  |  11  ----------------------------<  118<H>  4.3   |  25  |  0.79    Ca    8.7      18 Nov 2018 05:57  Phos  3.7     11-17  Mg     2.2     11-17    TPro  7.7  /  Alb  3.2<L>  /  TBili  0.3  /  DBili  x   /  AST  14  /  ALT  21  /  AlkPhos  82  11-16      LIVER FUNCTIONS - ( 16 Nov 2018 14:30 )  Alb: 3.2 g/dL / Pro: 7.7 g/dL / ALK PHOS: 82 U/L / ALT: 21 U/L DA / AST: 14 U/L / GGT: x             CULTURES: .Blood Blood-Peripheral  11-16 @ 18:03   No growth to date.  --  --    Vancomycin Level, Trough - Prior to Next Dose (11.18.18 @ 05:57)    Vancomycin Level, Trough: 11.2:         RADIOLOGY:      ROS:  [  ] UNABLE TO ELICIT

## 2018-11-18 NOTE — BRIEF OPERATIVE NOTE - PROCEDURE
Elizabeth presents to the clinic today for  removal of suture.  The patient has had the suture in place for 13 days.    There has been no history of infection or drainage.    O: 1 suture are seen located on the left lower leg.  The wound is healing well with no signs of infection.    Tetanus status is up to date.    A: Suture removal.    P:  All suture were easily removed today.  Routine wound care discussed.  The patient will follow up as needed.    Keri White, RN, BSN     <<-----Click on this checkbox to enter Procedure Incision and drainage  11/18/2018  right back abscess  Active  [facility]

## 2018-11-19 LAB
ANION GAP SERPL CALC-SCNC: 7 MMOL/L — SIGNIFICANT CHANGE UP (ref 5–17)
BUN SERPL-MCNC: 10 MG/DL — SIGNIFICANT CHANGE UP (ref 7–18)
CALCIUM SERPL-MCNC: 8.9 MG/DL — SIGNIFICANT CHANGE UP (ref 8.4–10.5)
CHLORIDE SERPL-SCNC: 106 MMOL/L — SIGNIFICANT CHANGE UP (ref 96–108)
CO2 SERPL-SCNC: 26 MMOL/L — SIGNIFICANT CHANGE UP (ref 22–31)
CREAT SERPL-MCNC: 0.73 MG/DL — SIGNIFICANT CHANGE UP (ref 0.5–1.3)
GLUCOSE SERPL-MCNC: 115 MG/DL — HIGH (ref 70–99)
HCT VFR BLD CALC: 32.4 % — LOW (ref 34.5–45)
HGB BLD-MCNC: 10 G/DL — LOW (ref 11.5–15.5)
MAGNESIUM SERPL-MCNC: 2.2 MG/DL — SIGNIFICANT CHANGE UP (ref 1.6–2.6)
MCHC RBC-ENTMCNC: 24 PG — LOW (ref 27–34)
MCHC RBC-ENTMCNC: 30.9 GM/DL — LOW (ref 32–36)
MCV RBC AUTO: 77.6 FL — LOW (ref 80–100)
PHOSPHATE SERPL-MCNC: 4.4 MG/DL — SIGNIFICANT CHANGE UP (ref 2.5–4.5)
PLATELET # BLD AUTO: 326 K/UL — SIGNIFICANT CHANGE UP (ref 150–400)
POTASSIUM SERPL-MCNC: 4.1 MMOL/L — SIGNIFICANT CHANGE UP (ref 3.5–5.3)
POTASSIUM SERPL-SCNC: 4.1 MMOL/L — SIGNIFICANT CHANGE UP (ref 3.5–5.3)
RBC # BLD: 4.18 M/UL — SIGNIFICANT CHANGE UP (ref 3.8–5.2)
RBC # FLD: 14.2 % — SIGNIFICANT CHANGE UP (ref 10.3–14.5)
SODIUM SERPL-SCNC: 139 MMOL/L — SIGNIFICANT CHANGE UP (ref 135–145)
WBC # BLD: 8.3 K/UL — SIGNIFICANT CHANGE UP (ref 3.8–10.5)
WBC # FLD AUTO: 8.3 K/UL — SIGNIFICANT CHANGE UP (ref 3.8–10.5)

## 2018-11-19 PROCEDURE — 99233 SBSQ HOSP IP/OBS HIGH 50: CPT | Mod: GC

## 2018-11-19 RX ADMIN — Medication 650 MILLIGRAM(S): at 01:28

## 2018-11-19 RX ADMIN — Medication 1: at 08:50

## 2018-11-19 RX ADMIN — Medication 250 MILLIGRAM(S): at 10:24

## 2018-11-19 RX ADMIN — INSULIN GLARGINE 10 UNIT(S): 100 INJECTION, SOLUTION SUBCUTANEOUS at 21:48

## 2018-11-19 RX ADMIN — Medication 250 MILLIGRAM(S): at 21:49

## 2018-11-19 RX ADMIN — SERTRALINE 50 MILLIGRAM(S): 25 TABLET, FILM COATED ORAL at 21:47

## 2018-11-19 RX ADMIN — Medication 650 MILLIGRAM(S): at 19:47

## 2018-11-19 RX ADMIN — Medication 650 MILLIGRAM(S): at 17:56

## 2018-11-19 NOTE — PROGRESS NOTE ADULT - PROBLEM SELECTOR PLAN 1
Cellulitis and abscess  s/p I & D   On Vancomycin 2000 mg IV twice daily, awaiting for culture results

## 2018-11-19 NOTE — PROGRESS NOTE ADULT - PROBLEM SELECTOR PLAN 1
s/p I&D  c/w vancomycin 2000mg q12  will get van through tomorrow  surgical swab prelim showing staph aureus   d/c plan tomorrow if pt stable

## 2018-11-19 NOTE — PROGRESS NOTE ADULT - SUBJECTIVE AND OBJECTIVE BOX
Patient is a 43y old  Female who presents with a chief complaint of Swelling on back on right side (19 Nov 2018 11:50)      INTERVAL HPI/OVERNIGHT EVENTS: seen and examined at bedside. No new complains, no overnight events. Asked to be tested for HIV     MEDICATIONS  (STANDING):  dextrose 5%. 1000 milliLiter(s) (50 mL/Hr) IV Continuous <Continuous>  dextrose 50% Injectable 12.5 Gram(s) IV Push once  dextrose 50% Injectable 25 Gram(s) IV Push once  dextrose 50% Injectable 25 Gram(s) IV Push once  enoxaparin Injectable 40 milliGRAM(s) SubCutaneous daily  insulin glargine Injectable (LANTUS) 10 Unit(s) SubCutaneous at bedtime  insulin lispro (HumaLOG) corrective regimen sliding scale   SubCutaneous three times a day before meals  sertraline 50 milliGRAM(s) Oral daily  vancomycin  IVPB 2000 milliGRAM(s) IV Intermittent every 12 hours    MEDICATIONS  (PRN):  acetaminophen   Tablet .. 650 milliGRAM(s) Oral every 6 hours PRN Temp greater or equal to 38C (100.4F), Mild Pain (1 - 3)  dextrose 40% Gel 15 Gram(s) Oral once PRN Blood Glucose LESS THAN 70 milliGRAM(s)/deciLiter  glucagon  Injectable 1 milliGRAM(s) IntraMuscular once PRN Glucose <70 milliGRAM(s)/deciLiter  oxyCODONE    5 mG/acetaminophen 325 mG 1 Tablet(s) Oral every 4 hours PRN Mild Pain (1 - 3)      Allergies    erythromycin (Unknown)  penicillin (Rash)  Rocephin (Fever; Short breath)    Intolerances        REVIEW OF SYSTEMS:  CONSTITUTIONAL: No fever, weight loss, or fatigue  RESPIRATORY: No cough, wheezing, chills or hemoptysis; No shortness of breath  CARDIOVASCULAR: No chest pain, palpitations, dizziness, or leg swelling  GASTROINTESTINAL: No abdominal or epigastric pain. No nausea, vomiting, or hematemesis; No diarrhea or constipation. No melena or hematochezia.  NEUROLOGICAL: No headaches, memory loss, loss of strength, numbness, or tremors  MUSCULOSKELETAL: No joint pain or swelling; No muscle, back, or extremity pain      Vital Signs Last 24 Hrs  T(C): 37.1 (19 Nov 2018 15:40), Max: 37.1 (19 Nov 2018 15:40)  T(F): 98.8 (19 Nov 2018 15:40), Max: 98.8 (19 Nov 2018 15:40)  HR: 72 (19 Nov 2018 15:40) (72 - 85)  BP: 103/53 (19 Nov 2018 15:40) (103/53 - 149/72)  BP(mean): --  RR: 17 (19 Nov 2018 15:40) (17 - 20)  SpO2: 97% (19 Nov 2018 15:40) (96% - 97%)    PHYSICAL EXAM:  GENERAL: NAD, well-groomed, well-developed, obese   HEAD:  Atraumatic, Normocephalic  EYES: conjunctiva and sclera clear  NECK: Supple, No JVD, Normal thyroid  NERVOUS SYSTEM:  Alert & Oriented X3, No gross focal deficits  CHEST/LUNG: Clear to percussion bilaterally; No rales, rhonchi, wheezing, or rubs  HEART: Regular rate and rhythm; No murmurs, rubs, or gallops  ABDOMEN: Soft, Nontender, Nondistended; Bowel sounds present  EXTREMITIES:  No clubbing, cyanosis, or edema  SKIN: incision wound on the right shoulder with dressing in place,   LABS:                        10.0   8.3   )-----------( 326      ( 19 Nov 2018 07:13 )             32.4     11-19    139  |  106  |  10  ----------------------------<  115<H>  4.1   |  26  |  0.73    Ca    8.9      19 Nov 2018 07:13  Phos  4.4     11-19  Mg     2.2     11-19      PT/INR - ( 18 Nov 2018 05:57 )   PT: 11.0 sec;   INR: 0.99 ratio             CAPILLARY BLOOD GLUCOSE      POCT Blood Glucose.: 112 mg/dL (19 Nov 2018 17:40)  POCT Blood Glucose.: 141 mg/dL (19 Nov 2018 11:36)  POCT Blood Glucose.: 153 mg/dL (19 Nov 2018 08:46)  POCT Blood Glucose.: 160 mg/dL (18 Nov 2018 21:27)  POCT Blood Glucose.: 104 mg/dL (18 Nov 2018 17:53)      RADIOLOGY & ADDITIONAL TESTS:    Imaging Personally Reviewed:  [ ] YES  [ ] NO    Consultant(s) Notes Reviewed:  [ ] YES  [ ] NO    Care Discussed with Consultants/Other Providers [ ] YES  [ ] NO    Plan of Care discussed with Housestaff [ ]YES [ ] NO

## 2018-11-19 NOTE — PROGRESS NOTE ADULT - SUBJECTIVE AND OBJECTIVE BOX
PGY 1 Note discussed with supervising resident and primary attending    Patient is a 43y old  Female who presents with a chief complaint of Swelling on back on right side (19 Nov 2018 17:47)      INTERVAL HPI/OVERNIGHT EVENTS: offers no new complaints; current symptoms resolving    MEDICATIONS  (STANDING):  dextrose 5%. 1000 milliLiter(s) (50 mL/Hr) IV Continuous <Continuous>  dextrose 50% Injectable 12.5 Gram(s) IV Push once  dextrose 50% Injectable 25 Gram(s) IV Push once  dextrose 50% Injectable 25 Gram(s) IV Push once  enoxaparin Injectable 40 milliGRAM(s) SubCutaneous daily  insulin glargine Injectable (LANTUS) 10 Unit(s) SubCutaneous at bedtime  insulin lispro (HumaLOG) corrective regimen sliding scale   SubCutaneous three times a day before meals  sertraline 50 milliGRAM(s) Oral daily  vancomycin  IVPB 2000 milliGRAM(s) IV Intermittent every 12 hours    MEDICATIONS  (PRN):  acetaminophen   Tablet .. 650 milliGRAM(s) Oral every 6 hours PRN Temp greater or equal to 38C (100.4F), Mild Pain (1 - 3)  dextrose 40% Gel 15 Gram(s) Oral once PRN Blood Glucose LESS THAN 70 milliGRAM(s)/deciLiter  glucagon  Injectable 1 milliGRAM(s) IntraMuscular once PRN Glucose <70 milliGRAM(s)/deciLiter  oxyCODONE    5 mG/acetaminophen 325 mG 1 Tablet(s) Oral every 4 hours PRN Mild Pain (1 - 3)      __________________________________________________  REVIEW OF SYSTEMS:    CONSTITUTIONAL: No fever,   EYES: no acute visual disturbances  NECK: No pain or stiffness  RESPIRATORY: No cough; No shortness of breath  CARDIOVASCULAR: No chest pain, no palpitations  GASTROINTESTINAL: No pain. No nausea or vomiting; No diarrhea   NEUROLOGICAL: No headache or numbness, no tremors  MUSCULOSKELETAL: No joint pain, no muscle pain  GENITOURINARY: no dysuria, no frequency, no hesitancy  PSYCHIATRY: no depression , no anxiety  ALL OTHER  ROS negative        Vital Signs Last 24 Hrs  T(C): 37.1 (19 Nov 2018 15:40), Max: 37.1 (19 Nov 2018 15:40)  T(F): 98.8 (19 Nov 2018 15:40), Max: 98.8 (19 Nov 2018 15:40)  HR: 72 (19 Nov 2018 15:40) (72 - 78)  BP: 103/53 (19 Nov 2018 15:40) (103/53 - 126/63)  BP(mean): --  RR: 17 (19 Nov 2018 15:40) (17 - 18)  SpO2: 97% (19 Nov 2018 15:40) (96% - 97%)    ________________________________________________  PHYSICAL EXAM:  GENERAL: NAD  HEENT: Normocephalic;  conjunctivae and sclerae clear; moist mucous membranes;   NECK : supple  CHEST/LUNG: Clear to auscultation bilaterally with good air entry   HEART: S1 S2  regular; no murmurs, gallops or rubs  ABDOMEN: Soft, Nontender, Nondistended; Bowel sounds present  EXTREMITIES: no cyanosis; no edema; no calf tenderness  SKIN: warm and dry; no rash  NERVOUS SYSTEM:  Awake and alert; Oriented  to place, person and time ; no new deficits    _________________________________________________  LABS:                        10.0   8.3   )-----------( 326      ( 19 Nov 2018 07:13 )             32.4     11-19    139  |  106  |  10  ----------------------------<  115<H>  4.1   |  26  |  0.73    Ca    8.9      19 Nov 2018 07:13  Phos  4.4     11-19  Mg     2.2     11-19      PT/INR - ( 18 Nov 2018 05:57 )   PT: 11.0 sec;   INR: 0.99 ratio             CAPILLARY BLOOD GLUCOSE      POCT Blood Glucose.: 112 mg/dL (19 Nov 2018 17:40)  POCT Blood Glucose.: 141 mg/dL (19 Nov 2018 11:36)  POCT Blood Glucose.: 153 mg/dL (19 Nov 2018 08:46)  POCT Blood Glucose.: 160 mg/dL (18 Nov 2018 21:27)        RADIOLOGY & ADDITIONAL TESTS:    Imaging Personally Reviewed:  YES    Consultant(s) Notes Reviewed:   YES       Plan of care was discussed with patient and /or primary care giver; all questions and concerns were addressed and care was aligned with patient's wishes.

## 2018-11-19 NOTE — PROGRESS NOTE ADULT - SUBJECTIVE AND OBJECTIVE BOX
43y Female is under our care for back cellulitis and abscess. Underwent I&D yesterday and specimen was sent. Patient is doing better and admits back pain has improved greatly. Culture results is still pending. Remains afebrile with normal wbc count.     REVIEW OF SYSTEMS:  [  ] Not able to illicit  General: no fevers no malaise  Chest: no cough no sob  GI: no nvd  : no urinary sxs   Skin: no rashes  Musculoskeletal: no trauma +improved back pain  Neuro: no ha's no dizziness     MEDS:  vancomycin  IVPB 2000 milliGRAM(s) IV Intermittent every 12 hours    ALLERGIES: erythromycin (Unknown)  penicillin (Rash)  Rocephin (Fever; Short breath)    VITALS:  Vital Signs Last 24 Hrs  T(C): 36.5 (19 Nov 2018 07:42), Max: 36.9 (18 Nov 2018 18:07)  T(F): 97.7 (19 Nov 2018 07:42), Max: 98.4 (18 Nov 2018 18:07)  HR: 78 (19 Nov 2018 07:42) (73 - 85)  BP: 126/63 (19 Nov 2018 07:42) (125/69 - 149/72)  BP(mean): --  RR: 17 (19 Nov 2018 07:42) (17 - 20)  SpO2: 97% (19 Nov 2018 07:42) (96% - 97%)      PHYSICAL EXAM:  HEENT: n/a  Neck: supple no LN's   Respiratory: lungs clear no rales  Cardiovascular: S1 S2 reg no murmurs  Gastrointestinal: +BS with soft, nondistended abdomen; nontender  Extremities: no edema  Skin: right upper back incision with clean borders and is packed with gauze, improved surrounding cellulitis  Ortho: n/a  Neuro: AAO x 3      LABS/DIAGNOSTIC TESTS:                        10.0   8.3   )-----------( 326      ( 19 Nov 2018 07:13 )             32.4     WBC Count: 8.3 K/uL (11-19 @ 07:13)  WBC Count: 7.0 K/uL (11-18 @ 05:57)  WBC Count: 8.0 K/uL (11-17 @ 06:48)  WBC Count: 11.2 K/uL (11-16 @ 14:51)    11-19    139  |  106  |  10  ----------------------------<  115<H>  4.1   |  26  |  0.73    Ca    8.9      19 Nov 2018 07:13  Phos  4.4     11-19  Mg     2.2     11-19        CULTURES:   11/19 abscess culture - pending     .Blood Blood-Peripheral  11-16 @ 18:03   No growth to date.  --  --        RADIOLOGY:  no new studies

## 2018-11-19 NOTE — CHART NOTE - NSCHARTNOTEFT_GEN_A_CORE
S/P drainage of right upper back abscess  Feeling wellICU Vital Signs Last 24 Hrs  T(C): 36.5 (19 Nov 2018 07:42), Max: 36.9 (18 Nov 2018 18:07)  T(F): 97.7 (19 Nov 2018 07:42), Max: 98.4 (18 Nov 2018 18:07)  HR: 78 (19 Nov 2018 07:42) (73 - 85)  BP: 126/63 (19 Nov 2018 07:42) (125/69 - 149/72)  BP(mean): --  ABP: --  ABP(mean): --  RR: 17 (19 Nov 2018 07:42) (17 - 20)  SpO2: 97% (19 Nov 2018 07:42) (96% - 97%)                          10.0   8.3   )-----------( 326      ( 19 Nov 2018 07:13 )             32.4   11-19    139  |  106  |  10  ----------------------------<  115<H>  4.1   |  26  |  0.73    Ca    8.9      19 Nov 2018 07:13  Phos  4.4     11-19  Mg     2.2     11-19    She can be discharged on po abx  She said her  can change the packing for her.  F/u in the office

## 2018-11-19 NOTE — PROGRESS NOTE ADULT - SUBJECTIVE AND OBJECTIVE BOX
INTERVAL HPI/OVERNIGHT EVENTS:    Pt seen and examined at bedside. Admits to incisional pain, no other complaints. Denies fever, chills, SOB or CP.     Vital Signs Last 24 Hrs  T(C): 36.5 (19 Nov 2018 07:42), Max: 36.9 (18 Nov 2018 18:07)  T(F): 97.7 (19 Nov 2018 07:42), Max: 98.4 (18 Nov 2018 18:07)  HR: 78 (19 Nov 2018 07:42) (73 - 85)  BP: 126/63 (19 Nov 2018 07:42) (125/69 - 149/72)  BP(mean): --  RR: 17 (19 Nov 2018 07:42) (17 - 20)  SpO2: 97% (19 Nov 2018 07:42) (96% - 97%)  I&O's Detail    vancomycin  IVPB 2000 milliGRAM(s) IV Intermittent every 12 hours      Physical Exam  General: AAOx3, No acute distress  Back: right upper back, 3x1x2 cm incision, min TTP, local erythema, packing removed, no active drainage, wound repacked, dressing applied  Extremities: non edematous, no calf pain bilaterally      Labs:                        10.0   8.3   )-----------( 326      ( 19 Nov 2018 07:13 )             32.4     11-19    139  |  106  |  10  ----------------------------<  115<H>  4.1   |  26  |  0.73    Ca    8.9      19 Nov 2018 07:13  Phos  4.4     11-19  Mg     2.2     11-19      PT/INR - ( 18 Nov 2018 05:57 )   PT: 11.0 sec;   INR: 0.99 ratio

## 2018-11-20 VITALS
HEART RATE: 71 BPM | RESPIRATION RATE: 18 BRPM | SYSTOLIC BLOOD PRESSURE: 114 MMHG | OXYGEN SATURATION: 97 % | DIASTOLIC BLOOD PRESSURE: 65 MMHG | TEMPERATURE: 97 F

## 2018-11-20 LAB
-  AMPICILLIN/SULBACTAM: SIGNIFICANT CHANGE UP
-  CEFAZOLIN: SIGNIFICANT CHANGE UP
-  CLINDAMYCIN: SIGNIFICANT CHANGE UP
-  ERYTHROMYCIN: SIGNIFICANT CHANGE UP
-  GENTAMICIN: SIGNIFICANT CHANGE UP
-  OXACILLIN: SIGNIFICANT CHANGE UP
-  RIFAMPIN: SIGNIFICANT CHANGE UP
-  TETRACYCLINE: SIGNIFICANT CHANGE UP
-  TRIMETHOPRIM/SULFAMETHOXAZOLE: SIGNIFICANT CHANGE UP
-  VANCOMYCIN: SIGNIFICANT CHANGE UP
ANION GAP SERPL CALC-SCNC: 10 MMOL/L — SIGNIFICANT CHANGE UP (ref 5–17)
BUN SERPL-MCNC: 13 MG/DL — SIGNIFICANT CHANGE UP (ref 7–18)
CALCIUM SERPL-MCNC: 9.1 MG/DL — SIGNIFICANT CHANGE UP (ref 8.4–10.5)
CHLORIDE SERPL-SCNC: 106 MMOL/L — SIGNIFICANT CHANGE UP (ref 96–108)
CO2 SERPL-SCNC: 25 MMOL/L — SIGNIFICANT CHANGE UP (ref 22–31)
CREAT SERPL-MCNC: 0.78 MG/DL — SIGNIFICANT CHANGE UP (ref 0.5–1.3)
GLUCOSE SERPL-MCNC: 104 MG/DL — HIGH (ref 70–99)
HCT VFR BLD CALC: 33.9 % — LOW (ref 34.5–45)
HGB BLD-MCNC: 10.5 G/DL — LOW (ref 11.5–15.5)
HIV 1+2 AB+HIV1 P24 AG SERPL QL IA: SIGNIFICANT CHANGE UP
MAGNESIUM SERPL-MCNC: 2.2 MG/DL — SIGNIFICANT CHANGE UP (ref 1.6–2.6)
MCHC RBC-ENTMCNC: 24 PG — LOW (ref 27–34)
MCHC RBC-ENTMCNC: 31 GM/DL — LOW (ref 32–36)
MCV RBC AUTO: 77.2 FL — LOW (ref 80–100)
METHOD TYPE: SIGNIFICANT CHANGE UP
PHOSPHATE SERPL-MCNC: 4.7 MG/DL — HIGH (ref 2.5–4.5)
PLATELET # BLD AUTO: 332 K/UL — SIGNIFICANT CHANGE UP (ref 150–400)
POTASSIUM SERPL-MCNC: 4.1 MMOL/L — SIGNIFICANT CHANGE UP (ref 3.5–5.3)
POTASSIUM SERPL-SCNC: 4.1 MMOL/L — SIGNIFICANT CHANGE UP (ref 3.5–5.3)
RBC # BLD: 4.39 M/UL — SIGNIFICANT CHANGE UP (ref 3.8–5.2)
RBC # FLD: 14.4 % — SIGNIFICANT CHANGE UP (ref 10.3–14.5)
SODIUM SERPL-SCNC: 141 MMOL/L — SIGNIFICANT CHANGE UP (ref 135–145)
VANCOMYCIN TROUGH SERPL-MCNC: 18.7 UG/ML — SIGNIFICANT CHANGE UP (ref 10–20)
WBC # BLD: 7.7 K/UL — SIGNIFICANT CHANGE UP (ref 3.8–10.5)
WBC # FLD AUTO: 7.7 K/UL — SIGNIFICANT CHANGE UP (ref 3.8–10.5)

## 2018-11-20 PROCEDURE — 82746 ASSAY OF FOLIC ACID SERUM: CPT

## 2018-11-20 PROCEDURE — 85027 COMPLETE CBC AUTOMATED: CPT

## 2018-11-20 PROCEDURE — 83036 HEMOGLOBIN GLYCOSYLATED A1C: CPT

## 2018-11-20 PROCEDURE — 87070 CULTURE OTHR SPECIMN AEROBIC: CPT

## 2018-11-20 PROCEDURE — 99285 EMERGENCY DEPT VISIT HI MDM: CPT | Mod: 25

## 2018-11-20 PROCEDURE — 83735 ASSAY OF MAGNESIUM: CPT

## 2018-11-20 PROCEDURE — 81025 URINE PREGNANCY TEST: CPT

## 2018-11-20 PROCEDURE — 87389 HIV-1 AG W/HIV-1&-2 AB AG IA: CPT

## 2018-11-20 PROCEDURE — 84100 ASSAY OF PHOSPHORUS: CPT

## 2018-11-20 PROCEDURE — 80053 COMPREHEN METABOLIC PANEL: CPT

## 2018-11-20 PROCEDURE — 80202 ASSAY OF VANCOMYCIN: CPT

## 2018-11-20 PROCEDURE — 82607 VITAMIN B-12: CPT

## 2018-11-20 PROCEDURE — 84443 ASSAY THYROID STIM HORMONE: CPT

## 2018-11-20 PROCEDURE — 87186 SC STD MICRODIL/AGAR DIL: CPT

## 2018-11-20 PROCEDURE — 85610 PROTHROMBIN TIME: CPT

## 2018-11-20 PROCEDURE — 80048 BASIC METABOLIC PNL TOTAL CA: CPT

## 2018-11-20 PROCEDURE — 83605 ASSAY OF LACTIC ACID: CPT

## 2018-11-20 PROCEDURE — 99239 HOSP IP/OBS DSCHRG MGMT >30: CPT

## 2018-11-20 PROCEDURE — 82962 GLUCOSE BLOOD TEST: CPT

## 2018-11-20 PROCEDURE — 93005 ELECTROCARDIOGRAM TRACING: CPT

## 2018-11-20 PROCEDURE — 87040 BLOOD CULTURE FOR BACTERIA: CPT

## 2018-11-20 PROCEDURE — 80061 LIPID PANEL: CPT

## 2018-11-20 RX ORDER — CEPHALEXIN 500 MG
1000 CAPSULE ORAL DAILY
Qty: 0 | Refills: 0 | Status: DISCONTINUED | OUTPATIENT
Start: 2018-11-20 | End: 2018-11-20

## 2018-11-20 RX ORDER — ACETAMINOPHEN 500 MG
2 TABLET ORAL
Qty: 56 | Refills: 0 | OUTPATIENT
Start: 2018-11-20 | End: 2018-11-26

## 2018-11-20 RX ORDER — CEPHALEXIN 500 MG
2 CAPSULE ORAL
Qty: 60 | Refills: 0 | OUTPATIENT
Start: 2018-11-20 | End: 2018-11-29

## 2018-11-20 RX ADMIN — Medication 250 MILLIGRAM(S): at 12:27

## 2018-11-20 RX ADMIN — Medication 100 MILLIGRAM(S): at 16:32

## 2018-11-20 RX ADMIN — Medication 2: at 11:56

## 2018-11-20 NOTE — PROGRESS NOTE ADULT - ASSESSMENT
Pt is 43 yr old female with PMH HTN, DM, Depression, cellulitis in past who came in with swelling and redness of back that started on saturday without any preceding injury, insect bite or rash. Pt was started on oral levaquin and bactrim by her pcp on saturday. Pt swelling continued to progress apt came to ED today for further evaluation
Pt is 43 yr old female with PMH HTN, DM, Depression, cellulitis in past who came in with swelling and redness of back that started on saturday without any preceding injury, insect bite or rash. Pt was started on oral levaquin and bactrim by her pcp on saturday. Pt swelling continued to progress apt came to ED today for further evaluation
1.	Back cellulitis/ abscess - s/p I&D   ·	cont vancomycin 2000mgs IV q12h  D4  ·	awaiting culture results
1.	Back cellulitis/ abscess - s/p I&D   ·	dc planning today  ·	dc on keflex 1gm PO TID for 10 days  ·	reconsult prn
44 y/o Female s/p I&D back abscess 11/18    -Daily dressing changes   -Pain medication PRN   -No further surgical intervention  -Pt may be discharged home w/ PO abx
Pt is 43 yr old female with PMH HTN, DM, Depression, cellulitis in past who came in with swelling and redness of back that started on saturday without any preceding injury, insect bite or rash. Pt was started on oral levaquin and bactrim by her pcp on saturday. Pt swelling continued to progress apt came to ED today for further evaluation
S/P I & D right upper back abscess    -Wound care  -Cont abx  -Medicine management
Pt is 43 yr old female with PMH HTN, DM, Depression, cellulitis in past who came in with swelling and redness of back that started on saturday without any preceding injury, insect bite or rash. Pt was started on oral levaquin and bactrim by her pcp on saturday. Pt swelling continued to progress apt came to ED today for further evaluation
Pt is 43 yr old female with PMH HTN, DM, Depression, cellulitis in past who came in with swelling and redness of back that started on saturday without any preceding injury, insect bite or rash. Pt was started on oral levaquin and bactrim by her pcp on saturday. Pt swelling continued to progress apt came to ED today for further evaluation
Back abscess - s/p I&D today    plan - vancomycin increased to 2000mgs iv q12hrs as trough low

## 2018-11-20 NOTE — PROGRESS NOTE ADULT - ATTENDING COMMENTS
I agree with above
Plan for possible discharge in AM
medically stable for discharge
Agree with all the above   Patient seen and examined at bedside. Had I&D earlier today. Vancomycin  through 11. c/w pain at the site of incision.   The plan of care as above and discussed with patient and her  at bedside.   Vital signs and labs reviewed.

## 2018-11-20 NOTE — PROGRESS NOTE ADULT - PROBLEM SELECTOR PLAN 4
Will c/w home dose of Zoloft 50mg daily
c/w home dose of Zoloft 50mg daily
Will c/w home dose of Zoloft 50mg daily

## 2018-11-20 NOTE — PROGRESS NOTE ADULT - SUBJECTIVE AND OBJECTIVE BOX
pt is pcn and rocephin allergic and so will switch antibiotic to doxycycline 100mgs po bid x 10 days instead.

## 2018-11-20 NOTE — PROGRESS NOTE ADULT - PROBLEM SELECTOR PLAN 5
IMPROVE VTE Individual Risk Assessment  RISK                                                                Points  [  ] Previous VTE                                                  3  [  ] Thrombophilia                                               2  [  ] Lower limb paralysis                                      2        (unable to hold up >15 seconds)    [  ] Current Cancer                                              2         (within 6 months)  [  ] Immobilization > 24 hrs                                1  [  ] ICU/CCU stay > 24 hours                              1    [  ] Age > 60                                                      1    IMPROVE VTE Score ____1_____    Pt on lovenox for dvt prophylaxis

## 2018-11-20 NOTE — DISCHARGE NOTE ADULT - CARE PLAN
Principal Discharge DX:	Cellulitis of back except buttock  Goal:	Please complete antibiotic course and follow up with your primary care doctor in a week  Assessment and plan of treatment:	You were admitted to the hospital because of swelling in right upper back, your blood culture was sent which was negative, you were seen by infectious disease doctor and you were treated with IV vancomycin, your vancomycin levels were monitored and vancomycin dosage was adjusted, you were also seen by surgery and you had incision and drainage of the abscess and surgical culture was sent which showed staphylococcus aureus. Please continue taking your antibiotics as advised and follow up with your primary care doctor in a week.  Secondary Diagnosis:	HTN (hypertension)  Assessment and plan of treatment:	Continue with blood pressure medication. Maintain a healthy diet that consist of low sugar, low fat, low sodium diet. Exercise frequently if possible.  Follow up with primary care physician in one week after discharge.  Secondary Diagnosis:	DM (diabetes mellitus)  Assessment and plan of treatment:	Continue with your blood sugar medication. HbAIC was found in admission on 7.  You must maintain a healthy diet that consist of low sugar, low fat, low sodium diet. Exercise frequently if possible. Consider repeating your Hemoglobin A1c within 3 months after discharge to monitor your average blood glucose control. Follow up with primary care physician in one week after discharge.  Secondary Diagnosis:	Depression  Assessment and plan of treatment:	Continue taking your home medication for depression and please follow up with your primary care doctor in a week. Principal Discharge DX:	Cellulitis of back except buttock  Goal:	Please complete antibiotic course and follow up with your primary care doctor in a week  Assessment and plan of treatment:	You were admitted to the hospital because of swelling in right upper back, your blood culture was sent which was negative, you were seen by infectious disease doctor and you were treated with IV vancomycin, your vancomycin levels were monitored and vancomycin dosage was adjusted, you were also seen by surgery and you had incision and drainage of the abscess and surgical culture was sent which showed staphylococcus aureus. Please continue taking your antibiotics as advised and follow up with your primary care doctor in a week.  Secondary Diagnosis:	HTN (hypertension)  Assessment and plan of treatment:	we are holding your home blood pressure medication because your blood pressure was well controlled during your hospitalization. Please follow up with your primary care doctor in a week so blood pressure medication can be restarted if clinically needed.  Secondary Diagnosis:	DM (diabetes mellitus)  Assessment and plan of treatment:	Continue with your blood sugar medication. HbAIC was found in admission on 7.  You must maintain a healthy diet that consist of low sugar, low fat, low sodium diet. Exercise frequently if possible. Consider repeating your Hemoglobin A1c within 3 months after discharge to monitor your average blood glucose control. Follow up with primary care physician in one week after discharge.  Secondary Diagnosis:	Depression  Assessment and plan of treatment:	Continue taking your home medication for depression and please follow up with your primary care doctor in a week.

## 2018-11-20 NOTE — PROGRESS NOTE ADULT - PROBLEM SELECTOR PLAN 1
Cellulitis and abscess  s/p I & D   On Vancomycin 2000 mg IV twice daily, will get todays dose   Plan for discharge on 10 days of doxycycline   HIV test that patient requested negative  Patient will follow with Dr. Mistry - surgeon as outpatient

## 2018-11-20 NOTE — DISCHARGE NOTE ADULT - MEDICATION SUMMARY - MEDICATIONS TO STOP TAKING
I will STOP taking the medications listed below when I get home from the hospital:  None I will STOP taking the medications listed below when I get home from the hospital:    enalapril 10 mg oral tablet  -- 1 tab(s) by mouth once a day

## 2018-11-20 NOTE — PROGRESS NOTE ADULT - REASON FOR ADMISSION
Swelling on back on right side

## 2018-11-20 NOTE — PROGRESS NOTE ADULT - PROBLEM SELECTOR PLAN 2
HBA1C 7   BG well controlled   On Lantus and Humalog per SS   Plan to discharge on Metformin
HBA1C 7   BG well controlled   Plan to discharge on Metformin
HBA1C 7   BG well controlled   On Lantus and Humalog per SS   Plan to discharge on Metformin

## 2018-11-20 NOTE — DISCHARGE NOTE ADULT - HOSPITAL COURSE
43 yof with pmhx dm, htn, depression, cellulitis was admitted because of cellulitis with abscess on right upper back.     hospital course:  Pt admitted to the hospital because of swelling in right upper back, vitals were stable in the ED, blood culture was sent which was negative, pt was seen by Dr. Willis and was treated with IV vancomycin,  vancomycin levels were monitored and vancomycin dosage was adjusted, pt was also seen by surgery and had incision and drainage of the abscess and surgical culture was sent which showed few staphylococcus aureus. pt was d/c on oral antibiotics and follow up with primary care doctor in a week. Pt's htn medication was held during hospitalization and continued on discharge.     This is a brief summary of the hospital course, please refer all notes for mor information. 43 yof with pmhx dm, htn, depression, cellulitis was admitted because of cellulitis with abscess on right upper back.     hospital course:  Pt admitted to the hospital because of swelling in right upper back, vitals were stable in the ED, blood culture was sent which was negative, pt was seen by Dr. Willis and was treated with IV vancomycin,  vancomycin levels were monitored and vancomycin dosage was adjusted, pt was also seen by surgery and had incision and drainage of the abscess and surgical culture was sent which showed few staphylococcus aureus. pt was d/c on oral antibiotics and follow up with primary care doctor in a week. Pt's htn medication was held during hospitalization.     This is a brief summary of the hospital course, please refer all notes for mor information.

## 2018-11-20 NOTE — PROGRESS NOTE ADULT - SUBJECTIVE AND OBJECTIVE BOX
43y Female is under our care for back cellulitis and abscess, s/p I&D. Patient continues to do well and admits upper back incisional pain has improved. Culture results grew pansensitive staph aureus. Remains afebrile with normal wbc count. Due for dc home on PO antibiotics.     REVIEW OF SYSTEMS:  [  ] Not able to illicit  General: no fevers no malaise  Chest: no cough no sob  GI: no nvd  : no urinary sxs   Skin: no rashes  Musculoskeletal: no trauma +improved back pain  Neuro: no ha's no dizziness     MEDS:  vancomycin  IVPB 2000 milliGRAM(s) IV Intermittent every 12 hours    ALLERGIES: erythromycin (Unknown)  penicillin (Rash)  Rocephin (Fever; Short breath)    VITALS:  Vital Signs Last 24 Hrs  T(C): 36.3 (20 Nov 2018 08:00), Max: 37.1 (19 Nov 2018 15:40)  T(F): 97.4 (20 Nov 2018 08:00), Max: 98.8 (19 Nov 2018 15:40)  HR: 72 (20 Nov 2018 08:00) (72 - 72)  BP: 110/61 (20 Nov 2018 08:00) (103/53 - 110/61)  BP(mean): --  RR: 18 (20 Nov 2018 08:00) (17 - 18)  SpO2: 98% (20 Nov 2018 08:00) (97% - 98%)      PHYSICAL EXAM:  HEENT: n/a  Neck: supple no LN's   Respiratory: lungs clear no rales  Cardiovascular: S1 S2 reg no murmurs  Gastrointestinal: +BS with soft, nondistended abdomen; nontender  Extremities: no edema  Skin: right upper back incision is dressed  Ortho: n/a  Neuro: AAO x 3      LABS/DIAGNOSTIC TESTS:                        10.5   7.7   )-----------( 332      ( 20 Nov 2018 07:12 )             33.9   11-20    141  |  106  |  13  ----------------------------<  104<H>  4.1   |  25  |  0.78    Ca    9.1      20 Nov 2018 07:12  Phos  4.7     11-20  Mg     2.2     11-20    Vancomycin Level, Trough (11.20.18 @ 07:12)    Vancomycin Level, Trough: 18.7        CULTURES:   Culture - Surgical Swab (11.18.18 @ 15:09)    -  Ampicillin/Sulbactam: S <=8/4    -  Trimethoprim/Sulfamethoxazole: S <=0.5/9.5    -  Vancomycin: S 1    -  Cefazolin: S <=4    -  Clindamycin: S <=0.25    -  Gentamicin: S <=1 Should not be used as monotherapy    -  Erythromycin: S <=0.25    -  Oxacillin: S <=0.25    -  RIF- Rifampin: S <=1 Should not be used as monotherapy    -  Tetra/Doxy: S <=1    Specimen Source: .Surgical Swab Right Upper Back    Culture Results:   Few Staphylococcus aureus    Organism Identification: Staphylococcus aureus    Organism: Staphylococcus aureus    Method Type: ARANZA      .Blood Blood-Peripheral  11-16 @ 18:03   No growth to date.  --  --        RADIOLOGY:  no new studies

## 2018-11-20 NOTE — DISCHARGE NOTE ADULT - PLAN OF CARE
Please complete antibiotic course and follow up with your primary care doctor in a week You were admitted to the hospital because of swelling in right upper back, your blood culture was sent which was negative, you were seen by infectious disease doctor and you were treated with IV vancomycin, your vancomycin levels were monitored and vancomycin dosage was adjusted, you were also seen by surgery and you had incision and drainage of the abscess and surgical culture was sent which showed staphylococcus aureus. Please continue taking your antibiotics as advised and follow up with your primary care doctor in a week. Continue with blood pressure medication. Maintain a healthy diet that consist of low sugar, low fat, low sodium diet. Exercise frequently if possible.  Follow up with primary care physician in one week after discharge. Continue with your blood sugar medication. HbAIC was found in admission on 7.  You must maintain a healthy diet that consist of low sugar, low fat, low sodium diet. Exercise frequently if possible. Consider repeating your Hemoglobin A1c within 3 months after discharge to monitor your average blood glucose control. Follow up with primary care physician in one week after discharge. Continue taking your home medication for depression and please follow up with your primary care doctor in a week. we are holding your home blood pressure medication because your blood pressure was well controlled during your hospitalization. Please follow up with your primary care doctor in a week so blood pressure medication can be restarted if clinically needed.

## 2018-11-20 NOTE — PROGRESS NOTE ADULT - SUBJECTIVE AND OBJECTIVE BOX
Patient is a 43y old  Female who presents with a chief complaint of Swelling on back on right side (20 Nov 2018 14:59)      INTERVAL HPI/OVERNIGHT EVENTS: seen and examined at bedside. No new complains. Culture results and sensitivity available     MEDICATIONS  (STANDING):  dextrose 5%. 1000 milliLiter(s) (50 mL/Hr) IV Continuous <Continuous>  dextrose 50% Injectable 12.5 Gram(s) IV Push once  dextrose 50% Injectable 25 Gram(s) IV Push once  dextrose 50% Injectable 25 Gram(s) IV Push once  enoxaparin Injectable 40 milliGRAM(s) SubCutaneous daily  insulin glargine Injectable (LANTUS) 10 Unit(s) SubCutaneous at bedtime  insulin lispro (HumaLOG) corrective regimen sliding scale   SubCutaneous three times a day before meals  sertraline 50 milliGRAM(s) Oral daily    MEDICATIONS  (PRN):  acetaminophen   Tablet .. 650 milliGRAM(s) Oral every 6 hours PRN Temp greater or equal to 38C (100.4F), Mild Pain (1 - 3)  dextrose 40% Gel 15 Gram(s) Oral once PRN Blood Glucose LESS THAN 70 milliGRAM(s)/deciLiter  glucagon  Injectable 1 milliGRAM(s) IntraMuscular once PRN Glucose <70 milliGRAM(s)/deciLiter  oxyCODONE    5 mG/acetaminophen 325 mG 1 Tablet(s) Oral every 4 hours PRN Mild Pain (1 - 3)      Allergies    erythromycin (Unknown)  penicillin (Rash)  Rocephin (Fever; Short breath)    Intolerances        REVIEW OF SYSTEMS:  CONSTITUTIONAL: No fever, weight loss, or fatigue  RESPIRATORY: No cough, wheezing, chills or hemoptysis; No shortness of breath  CARDIOVASCULAR: No chest pain, palpitations, dizziness, or leg swelling  GASTROINTESTINAL: No abdominal or epigastric pain. No nausea, vomiting, or hematemesis; No diarrhea or constipation. No melena or hematochezia.  NEUROLOGICAL: No headaches, memory loss, loss of strength, numbness, or tremors  MUSCULOSKELETAL: No joint pain or swelling; No muscle, back, or extremity pain      Vital Signs Last 24 Hrs  T(C): 36.3 (20 Nov 2018 08:00), Max: 36.3 (20 Nov 2018 08:00)  T(F): 97.4 (20 Nov 2018 08:00), Max: 97.4 (20 Nov 2018 08:00)  HR: 72 (20 Nov 2018 08:00) (72 - 72)  BP: 110/61 (20 Nov 2018 08:00) (110/61 - 110/61)  BP(mean): --  RR: 18 (20 Nov 2018 08:00) (18 - 18)  SpO2: 98% (20 Nov 2018 08:00) (98% - 98%)    PHYSICAL EXAM:  GENERAL: NAD, well-groomed, well-developed  HEAD:  Atraumatic, Normocephalic  EYES: EOMI, PERRLA, conjunctiva and sclera clear  NECK: Supple, No JVD, Normal thyroid  NERVOUS SYSTEM:  Alert & Oriented X3, No gross focal deficits  CHEST/LUNG: Clear to percussion bilaterally; No rales, rhonchi, wheezing, or rubs  HEART: Regular rate and rhythm; No murmurs, rubs, or gallops  ABDOMEN: Soft, Nontender, Nondistended; Bowel sounds present  EXTREMITIES:  No clubbing, cyanosis, or edema  SKIN: No rashes or lesions    LABS:                        10.5   7.7   )-----------( 332      ( 20 Nov 2018 07:12 )             33.9     11-20    141  |  106  |  13  ----------------------------<  104<H>  4.1   |  25  |  0.78    Ca    9.1      20 Nov 2018 07:12  Phos  4.7     11-20  Mg     2.2     11-20          CAPILLARY BLOOD GLUCOSE      POCT Blood Glucose.: 202 mg/dL (20 Nov 2018 11:46)  POCT Blood Glucose.: 119 mg/dL (20 Nov 2018 08:39)  POCT Blood Glucose.: 135 mg/dL (19 Nov 2018 21:46)  POCT Blood Glucose.: 112 mg/dL (19 Nov 2018 17:40)      RADIOLOGY & ADDITIONAL TESTS:    Imaging Personally Reviewed:  [ ] YES  [ ] NO    Consultant(s) Notes Reviewed:  [ ] YES  [ ] NO    Care Discussed with Consultants/Other Providers [ ] YES  [ ] NO    Plan of Care discussed with Housestaff [ ]YES [ ] NO

## 2018-11-20 NOTE — DISCHARGE NOTE ADULT - MEDICATION SUMMARY - MEDICATIONS TO TAKE
I will START or STAY ON the medications listed below when I get home from the hospital:    acetaminophen 325 mg oral tablet  -- 2 tab(s) by mouth every 6 hours, As needed for pain  -- Indication: For Pain    enalapril 10 mg oral tablet  -- 1 tab(s) by mouth once a day  -- Indication: For HTN (hypertension)    Zoloft 50 mg oral tablet  -- 1 tab(s) by mouth once a day  -- Indication: For Depression    metFORMIN 500 mg oral tablet  -- 1 tab(s) by mouth 2 times a day  -- Indication: For DM (diabetes mellitus)    Keflex 500 mg oral capsule  -- 2 cap(s) by mouth 3 times a day   -- Finish all this medication unless otherwise directed by prescriber.    -- Indication: For abscess I will START or STAY ON the medications listed below when I get home from the hospital:    Zoloft 50 mg oral tablet  -- 1 tab(s) by mouth once a day  -- Indication: For Depression    metFORMIN 500 mg oral tablet  -- 1 tab(s) by mouth 2 times a day  -- Indication: For DM (diabetes mellitus)    doxycycline hyclate 100 mg oral tablet  -- 1 tab(s) by mouth 2 times a day   -- Avoid prolonged or excessive exposure to direct and/or artificial sunlight while taking this medication.  Do not take this drug if you are pregnant.  Finish all this medication unless otherwise directed by prescriber.  Medication should be taken with plenty of water.    -- Indication: For back abscess

## 2018-11-20 NOTE — PROGRESS NOTE ADULT - PROVIDER SPECIALTY LIST ADULT
Hospitalist
Infectious Disease
Internal Medicine
Surgery
Surgery
Hospitalist
Internal Medicine
Hospitalist

## 2018-11-21 LAB
CULTURE RESULTS: SIGNIFICANT CHANGE UP
CULTURE RESULTS: SIGNIFICANT CHANGE UP
SPECIMEN SOURCE: SIGNIFICANT CHANGE UP
SPECIMEN SOURCE: SIGNIFICANT CHANGE UP

## 2018-11-23 LAB
CULTURE RESULTS: SIGNIFICANT CHANGE UP
ORGANISM # SPEC MICROSCOPIC CNT: SIGNIFICANT CHANGE UP
ORGANISM # SPEC MICROSCOPIC CNT: SIGNIFICANT CHANGE UP
SPECIMEN SOURCE: SIGNIFICANT CHANGE UP

## 2019-07-07 NOTE — PATIENT PROFILE ADULT. - SURGICAL SITE INCISION
"Requested Prescriptions   Pending Prescriptions Disp Refills     lisinopril (PRINIVIL/ZESTRIL) 20 MG tablet [Pharmacy Med Name: LISINOPRIL 20MG TABS] 90 tablet 0     Sig: TAKE ONE TABLET BY MOUTH ONCE DAILY  Last Written Prescription Date:  04/30/2019  Last Fill Quantity: 90 tablet,  # refills: 0   Last office visit: 6/18/2019 with prescribing provider:  Rodri Greer PA-C    Future Office Visit:           ACE Inhibitors (Including Combos) Protocol Failed - 7/7/2019 11:21 AM        Failed - Blood pressure under 140/90 in past 12 months     BP Readings from Last 3 Encounters:   06/18/19 (!) 160/100   06/14/19 (!) 174/94   06/06/19 146/80                 Passed - Recent (12 mo) or future (30 days) visit within the authorizing provider's specialty     Patient had office visit in the last 12 months or has a visit in the next 30 days with authorizing provider or within the authorizing provider's specialty.  See \"Patient Info\" tab in inbasket, or \"Choose Columns\" in Meds & Orders section of the refill encounter.              Passed - Medication is active on med list        Passed - Patient is age 18 or older        Passed - Normal serum creatinine on file in past 12 months     Recent Labs   Lab Test 04/08/19  1109   CR 0.91             Passed - Normal serum potassium on file in past 12 months     Recent Labs   Lab Test 04/08/19  1109   POTASSIUM 4.3               " no

## 2021-12-13 NOTE — ED ADULT NURSE NOTE - DISCHARGE DATE/TIME
Call to pharmacy after call received from patient, reviewed prescription sent 12/9/21 for #90 with 3 refills, pharmacy reports they are not in her insurance network.  She will need to choose a pharmacy in her network and then request prescription be transferred.  Call to Sam[facility], advised of needing to choose a new pharmacy within her insurance network and then have the prescription transferred to the new pharmacy.  Advised the new pharmacy will make the transfer for her.  She will need to supply them with the phone number for [facility].  Verbalizes understanding.  
[facility] Call Center    Phone Message    May a detailed message be left on voicemail: yes     Reason for Call: Medication Refill Request    Has the patient contacted the pharmacy for the refill? Yes   Name of medication being requested: norethindrone-ethinyl estradiol (JUNEL FE 1/20) 1-20 MG-MCG tablet   Provider who prescribed the medication:Elvis Willoughby MD  Pharmacy: [id number] [address] [address] AT [place] [address] & [facility]  Date medication is needed: today    Patient requesting meds are filled. Patient spoke to pharmacy and they do not have meds on file. Writer confirmed with patient that pharmacy location is correct.     Action Taken: Message routed to:  [facility]    Travel Screening: Not Applicable                                                                      
17-Aug-2017 08:35

## 2022-06-19 ENCOUNTER — EMERGENCY (EMERGENCY)
Facility: HOSPITAL | Age: 47
LOS: 1 days | Discharge: HOME | End: 2022-06-19
Attending: EMERGENCY MEDICINE
Payer: COMMERCIAL

## 2022-06-19 VITALS
DIASTOLIC BLOOD PRESSURE: 97 MMHG | WEIGHT: 293 LBS | RESPIRATION RATE: 17 BRPM | OXYGEN SATURATION: 95 % | HEART RATE: 88 BPM | SYSTOLIC BLOOD PRESSURE: 153 MMHG | TEMPERATURE: 98 F | HEIGHT: 69 IN

## 2022-06-19 DIAGNOSIS — Z98.891 HISTORY OF UTERINE SCAR FROM PREVIOUS SURGERY: Chronic | ICD-10-CM

## 2022-06-19 DIAGNOSIS — Z87.19 PERSONAL HISTORY OF OTHER DISEASES OF THE DIGESTIVE SYSTEM: Chronic | ICD-10-CM

## 2022-06-19 LAB
FLUAV AG NPH QL: SIGNIFICANT CHANGE UP
FLUBV AG NPH QL: SIGNIFICANT CHANGE UP
SARS-COV-2 RNA SPEC QL NAA+PROBE: SIGNIFICANT CHANGE UP

## 2022-06-19 PROCEDURE — 99285 EMERGENCY DEPT VISIT HI MDM: CPT

## 2022-06-19 PROCEDURE — 93005 ELECTROCARDIOGRAM TRACING: CPT

## 2022-06-19 PROCEDURE — 94640 AIRWAY INHALATION TREATMENT: CPT

## 2022-06-19 PROCEDURE — 99285 EMERGENCY DEPT VISIT HI MDM: CPT | Mod: 25

## 2022-06-19 PROCEDURE — 87637 SARSCOV2&INF A&B&RSV AMP PRB: CPT

## 2022-06-19 PROCEDURE — 71046 X-RAY EXAM CHEST 2 VIEWS: CPT | Mod: 26

## 2022-06-19 PROCEDURE — 71046 X-RAY EXAM CHEST 2 VIEWS: CPT

## 2022-06-19 RX ORDER — CIPROFLOXACIN LACTATE 400MG/40ML
1 VIAL (ML) INTRAVENOUS
Qty: 4 | Refills: 0
Start: 2022-06-19 | End: 2022-06-22

## 2022-06-19 RX ORDER — IPRATROPIUM/ALBUTEROL SULFATE 18-103MCG
3 AEROSOL WITH ADAPTER (GRAM) INHALATION ONCE
Refills: 0 | Status: COMPLETED | OUTPATIENT
Start: 2022-06-19 | End: 2022-06-19

## 2022-06-19 RX ORDER — ALBUTEROL 90 UG/1
1 AEROSOL, METERED ORAL
Qty: 1 | Refills: 0
Start: 2022-06-19 | End: 2022-06-23

## 2022-06-19 RX ADMIN — Medication 50 MILLIGRAM(S): at 11:37

## 2022-06-19 RX ADMIN — Medication 3 MILLILITER(S): at 11:34

## 2022-06-19 NOTE — ED PROVIDER NOTE - OBJECTIVE STATEMENT
this is a 46 y.o female with a PMhx of DM, Depression presented to the ED complaining of having a cough along with fevers over the past week, she went to her PCP on tuesday whom started her on doxycycline, however states that her symptoms were getting worse over the past few days, she has been having persistent coughing and fever of 101. She also has been noticing the cough worsening at night, she has some associated chest tightness when she coughs, She denies having any recent travel or leg swelling. Patient denies having any nausea, vomiting, diarrhea, constipation, CP, MARTINEZ, headaches, or dizziness. Pt was a former smoker. She got tested for covid and was negative, she is vaccinated against covid.

## 2022-06-19 NOTE — ED PROVIDER NOTE - ATTENDING APP SHARED VISIT CONTRIBUTION OF CARE
I was physically present for the E/M service provided. I agree with above history, physical, and plan which I have reviewed and edited where appropriate. I was physically present for the key portions of the service provided.    Campbell: cough x 1 week. pt having fever at home. +wheezing. r/o pna vs bronchitis vs viral uri. nebs, prednisone, cxr, rvp and likely dc

## 2022-06-19 NOTE — ED PROVIDER NOTE - NS ED ATTENDING STATEMENT MOD
This was a shared visit with the EDAGR. I reviewed and verified the documentation and independently performed the documented:

## 2022-06-19 NOTE — ED PROVIDER NOTE - CLINICAL SUMMARY MEDICAL DECISION MAKING FREE TEXT BOX
this is a 46 y.o female with a PMhx of DM, Depression presented to the ED complaining of having a cough along with fevers over the past week. Cough/fever-chest xray, duo nebs, prednisone, reassess

## 2022-06-19 NOTE — ED PROVIDER NOTE - NSFOLLOWUPINSTRUCTIONS_ED_ALL_ED_FT
Rest, drink plenty of fluids.  Advance activity as tolerated.  Continue all previously prescribed medications as directed.  Follow up with your primary care physician in 48-72 hours- bring copies of your results.  Return to the ER for worsening or persistent symptoms, and/or ANY NEW OR CONCERNING SYMPTOMS. If you have issues obtaining follow up, please call: [phone] to obtain a doctor or specialist who takes your insurance in your area.  You may call [phone] to make an appointment with the internal medicine clinic.    Please return if symptoms persist or worsen, please complete course of antibiotics and use inhaler PRN.

## 2022-06-19 NOTE — ED PROVIDER NOTE - PATIENT PORTAL LINK FT
You can access the FollowMyHealth Patient Portal offered by [facility] by registering at the following website: [email or web address]. By joining [facility]’s FollowMyHealth portal, you will also be able to view your health information using other applications (apps) compatible with our system.

## 2022-06-22 ENCOUNTER — EMERGENCY (EMERGENCY)
Facility: HOSPITAL | Age: 47
LOS: 1 days | Discharge: HOME | End: 2022-06-22
Attending: STUDENT IN AN ORGANIZED HEALTH CARE EDUCATION/TRAINING PROGRAM
Payer: COMMERCIAL

## 2022-06-22 VITALS
DIASTOLIC BLOOD PRESSURE: 86 MMHG | TEMPERATURE: 98 F | SYSTOLIC BLOOD PRESSURE: 107 MMHG | RESPIRATION RATE: 19 BRPM | HEART RATE: 80 BPM | OXYGEN SATURATION: 98 % | WEIGHT: 293 LBS | HEIGHT: 69 IN

## 2022-06-22 DIAGNOSIS — Z87.19 PERSONAL HISTORY OF OTHER DISEASES OF THE DIGESTIVE SYSTEM: Chronic | ICD-10-CM

## 2022-06-22 DIAGNOSIS — Z98.891 HISTORY OF UTERINE SCAR FROM PREVIOUS SURGERY: Chronic | ICD-10-CM

## 2022-06-22 PROCEDURE — 99283 EMERGENCY DEPT VISIT LOW MDM: CPT

## 2022-06-22 RX ORDER — METHOCARBAMOL 500 MG/1
1 TABLET, FILM COATED ORAL
Qty: 15 | Refills: 0
Start: 2022-06-22 | End: 2022-06-26

## 2022-06-22 RX ORDER — IBUPROFEN 200 MG
1 TABLET ORAL
Qty: 20 | Refills: 0
Start: 2022-06-22 | End: 2022-06-26

## 2022-06-22 RX ORDER — DIAZEPAM 5 MG
5 TABLET ORAL ONCE
Refills: 0 | Status: DISCONTINUED | OUTPATIENT
Start: 2022-06-22 | End: 2022-06-22

## 2022-06-22 RX ORDER — IBUPROFEN 200 MG
600 TABLET ORAL ONCE
Refills: 0 | Status: COMPLETED | OUTPATIENT
Start: 2022-06-22 | End: 2022-06-22

## 2022-06-22 RX ADMIN — Medication 5 MILLIGRAM(S): at 14:30

## 2022-06-22 RX ADMIN — Medication 600 MILLIGRAM(S): at 14:29

## 2022-06-22 NOTE — ED PROVIDER NOTE - PROGRESS NOTE DETAILS
no
Significant improvement of symptoms. Neck full range of motion. WIll dc with Rx and PMD follow up in 2-3 days. Pt is well appearing walking with steady gait, stable for discharge and follow up without fail with medical doctor. I had a detailed discussion with the patient and/or guardian regarding the historical points, exam findings, and any diagnostic results supporting the discharge diagnosis. Pt educated on care and need for follow up. Strict return instructions and red flag signs and symptoms discussed with patient. Questions answered. Pt shows understanding of discharge information and agrees to follow.

## 2022-06-22 NOTE — ED PROVIDER NOTE - CHPI ED SYMPTOMS NEG
no arm pain, no chest pain, no focal weakness, no vision changes, no unsteady gait, no coordination problems/no numbness/no tingling

## 2022-06-22 NOTE — ED PROVIDER NOTE - CLINICAL SUMMARY MEDICAL DECISION MAKING FREE TEXT BOX
47 y/o female presents w/ new onset of right neck and shoulder pain today. Neurovascularly intact. Symptoms and exam consistent w/ musculoskeletal pain. Patient also feels much better since starting the Levaquin/prednisone/albuterol. Plan: meds, warm compress, serial exam, and likely discharge home.

## 2022-06-22 NOTE — ED PROVIDER NOTE - NSFOLLOWUPINSTRUCTIONS_ED_ALL_ED_FT
Follow up with the primary care doctor in 2-3 days.  If you experience any new or worsening symptoms or if you are concerned you can always come back to the emergency for a re-evaluation.  If there were any prescriptions given to you during the visit today take them as prescribed. If you have any questions you can ask the pharmacist.     Contact a doctor if:  Your symptoms get worse.  Your symptoms do not get better after 2 weeks of treatment.  Your pain gets worse.  Medicine does not help your pain.  You have new symptoms that you cannot explain.  Your neck collar gives you sores on your skin or bothers your skin.    Get help right away if:  You have very bad pain.  You get any of the following in any part of your body:    Loss of feeling.  Tingling.  Weakness.  You cannot move a part of your body.  You have neck pain and either of these:  Very bad dizziness.  A very bad headache.

## 2022-06-22 NOTE — ED PROVIDER NOTE - PHYSICAL EXAMINATION
Musculoskeletal: limited ROM when turning head to the left, right suboccipital point tenderness, and right trapezius trigger point w/ tenderness; upper extremity strength intact, 2+ bilaterally       Cardiac/Lung: normal    Neuro: normal

## 2022-06-22 NOTE — ED PROVIDER NOTE - NS ED ATTENDING STATEMENT MOD
This was a shared visit with the EDGAR. I reviewed and verified the documentation and independently performed the documented:

## 2022-06-22 NOTE — ED PROVIDER NOTE - NSICDXPASTMEDICALHX_GEN_ALL_CORE_FT
PAST MEDICAL HISTORY:  Depression     DM (diabetes mellitus)     HTN (hypertension)       PNA (pneumonia)

## 2022-06-22 NOTE — ED PROVIDER NOTE - OBJECTIVE STATEMENT
47 y/o female, history of diabetes, depression, recently diagnosed pneumonia, presents w/ chief complaint right-sided neck and right shoulder pain since today. Gradual, sharp onset; localized right-sided neck pain and right shoulder pain. Associated w/ stiffness when trying to turn the head to the left. No shooting pain down the arm, no radiation to the chest. Denies any numbness, tingling, focal weakness, vision changes, unsteady gait, problem w/ coordination, or any other complaints. No known drug allergies.

## 2022-06-22 NOTE — ED PROVIDER NOTE - PATIENT PORTAL LINK FT
You can access the FollowMyHealth Patient Portal offered by [facility] by registering at the following website: [email or web address]. By joining [employer]’s FollowMyHealth portal, you will also be able to view your health information using other applications (apps) compatible with our system.

## 2022-06-22 NOTE — ED PROVIDER NOTE - NSICDXFAMILYHX_GEN_ALL_CORE_FT
FAMILY HISTORY:  Family history of hypertension in father    Grandparent  Still living? Unknown  Family history of lung cancer, Age at diagnosis: Age Unknown  Family history of prostate cancer, Age at diagnosis: Age Unknown

## 2023-07-11 NOTE — PATIENT PROFILE ADULT. - FUNCTIONAL SCREEN CURRENT LEVEL: COMMUNICATION, MLM
Render Note In Bullet Format When Appropriate: No Medical Necessity Clause: This procedure was medically necessary because the lesions that were treated were: Consent: The patient's consent was obtained including but not limited to risks of crusting, scabbing, blistering, scarring, darker or lighter pigmentary change, recurrence, incomplete removal and infection. Detail Level: Detailed Show Spray Paint Technique Variable?: Yes Post-Care Instructions: I reviewed with the patient in detail post-care instructions. Patient is to wear sunprotection, and avoid picking at any of the treated lesions. Pt may apply Vaseline to crusted or scabbing areas. Spray Paint Text: The liquid nitrogen was applied to the skin utilizing a spray paint frosting technique. (0) understands/communicates without difficulty Medical Necessity Information: It is in your best interest to select a reason for this procedure from the list below. All of these items fulfill various CMS LCD requirements except the new and changing color options.

## 2023-10-09 NOTE — PATIENT PROFILE ADULT. - NSTOBACCONEVERSMOKERY/N_GEN_A
Sharona Virgen is a 53 year old female presenting WITH LEFT FOOT FOLLOW UP.  Date of injury: 4/19/2023. Employer: [employer].    Medication verified and med list updated  Denies known Latex allergy or symptoms of Latex sensitivity     No

## 2024-02-14 NOTE — BRIEF OPERATIVE NOTE - CONDITION POST OP
"Lab Results   Component Value Date    HGBA1C 6.4 06/23/2023       No results for input(s): \"POCGLU\" in the last 72 hours.    Blood Sugar Average: Last 72 hrs:  Resume on insulin regimen  ISS    " Stable

## 2024-02-29 NOTE — ED ADULT NURSE NOTE - PRO INTERPRETER NEED 2
Goal Outcome Evaluation:  Plan of Care Reviewed With: patient        Progress: no change  Outcome Evaluation: Patient is alert and oriented x4. Given PRN Percocet at 0805 for severe left hip pain. Med effective. Given PRN Baclofen at 1820. Med effective. Con't to refuse turns unless getting on/off bedpan. Voices needs. Con't current POC.                               
I have reviewed and confirmed nurses' notes...
English